# Patient Record
Sex: FEMALE | Race: WHITE | NOT HISPANIC OR LATINO
[De-identification: names, ages, dates, MRNs, and addresses within clinical notes are randomized per-mention and may not be internally consistent; named-entity substitution may affect disease eponyms.]

---

## 2017-06-07 ENCOUNTER — APPOINTMENT (OUTPATIENT)
Dept: GASTROENTEROLOGY | Facility: CLINIC | Age: 83
End: 2017-06-07

## 2017-06-07 VITALS
HEART RATE: 95 BPM | TEMPERATURE: 97.9 F | SYSTOLIC BLOOD PRESSURE: 158 MMHG | OXYGEN SATURATION: 98 % | HEIGHT: 63 IN | WEIGHT: 204 LBS | BODY MASS INDEX: 36.14 KG/M2 | DIASTOLIC BLOOD PRESSURE: 86 MMHG

## 2017-06-07 DIAGNOSIS — Z82.49 FAMILY HISTORY OF ISCHEMIC HEART DISEASE AND OTHER DISEASES OF THE CIRCULATORY SYSTEM: ICD-10-CM

## 2017-06-07 DIAGNOSIS — Z86.010 PERSONAL HISTORY OF COLONIC POLYPS: ICD-10-CM

## 2017-06-07 DIAGNOSIS — R19.7 DIARRHEA, UNSPECIFIED: ICD-10-CM

## 2017-06-07 DIAGNOSIS — Z83.1 FAMILY HISTORY OF OTHER INFECTIOUS AND PARASITIC DISEASES: ICD-10-CM

## 2018-07-09 ENCOUNTER — APPOINTMENT (OUTPATIENT)
Dept: GASTROENTEROLOGY | Facility: CLINIC | Age: 84
End: 2018-07-09
Payer: MEDICARE

## 2018-07-09 VITALS
HEART RATE: 88 BPM | HEIGHT: 63 IN | WEIGHT: 205 LBS | RESPIRATION RATE: 16 BRPM | BODY MASS INDEX: 36.32 KG/M2 | DIASTOLIC BLOOD PRESSURE: 75 MMHG | SYSTOLIC BLOOD PRESSURE: 130 MMHG | TEMPERATURE: 98.3 F | OXYGEN SATURATION: 98 %

## 2018-07-09 DIAGNOSIS — K43.2 INCISIONAL HERNIA W/OUT OBSTRUCTION OR GANGRENE: ICD-10-CM

## 2018-07-09 DIAGNOSIS — R15.9 FULL INCONTINENCE OF FECES: ICD-10-CM

## 2018-07-09 DIAGNOSIS — K43.9 VENTRAL HERNIA W/OUT OBSTRUCTION OR GANGRENE: ICD-10-CM

## 2018-07-09 PROCEDURE — 99214 OFFICE O/P EST MOD 30 MIN: CPT

## 2019-07-29 ENCOUNTER — APPOINTMENT (OUTPATIENT)
Dept: GASTROENTEROLOGY | Facility: CLINIC | Age: 85
End: 2019-07-29
Payer: MEDICARE

## 2019-07-29 VITALS
SYSTOLIC BLOOD PRESSURE: 140 MMHG | WEIGHT: 205 LBS | HEART RATE: 108 BPM | DIASTOLIC BLOOD PRESSURE: 90 MMHG | HEIGHT: 63 IN | TEMPERATURE: 98.7 F | BODY MASS INDEX: 36.32 KG/M2 | OXYGEN SATURATION: 99 %

## 2019-07-29 DIAGNOSIS — K58.0 IRRITABLE BOWEL SYNDROME WITH DIARRHEA: ICD-10-CM

## 2019-07-29 DIAGNOSIS — K62.7 RADIATION PROCTITIS: ICD-10-CM

## 2019-07-29 DIAGNOSIS — Z85.42 PERSONAL HISTORY OF MALIGNANT NEOPLASM OF OTHER PARTS OF UTERUS: ICD-10-CM

## 2019-07-29 DIAGNOSIS — Z86.39 PERSONAL HISTORY OF OTHER ENDOCRINE, NUTRITIONAL AND METABOLIC DISEASE: ICD-10-CM

## 2019-07-29 PROCEDURE — 99214 OFFICE O/P EST MOD 30 MIN: CPT

## 2019-07-29 RX ORDER — METHYLCELLULOSE 2 G/10.2G
POWDER, FOR SOLUTION ORAL
Qty: 1 | Refills: 0 | Status: ACTIVE | COMMUNITY
Start: 2018-07-09

## 2019-07-29 RX ORDER — DICYCLOMINE HYDROCHLORIDE 10 MG/1
10 CAPSULE ORAL 3 TIMES DAILY
Qty: 50 | Refills: 1 | Status: ACTIVE | COMMUNITY
Start: 2019-07-29 | End: 1900-01-01

## 2019-07-29 NOTE — REVIEW OF SYSTEMS
[Eyesight Problems] : eyesight problems [Loss Of Hearing] : hearing loss [As Noted in HPI] : as noted in HPI [Arthralgias] : arthralgias [Joint Pain] : joint pain [FreeTextEntry9] : Walks with walker [Negative] : Endocrine

## 2019-07-29 NOTE — HISTORY OF PRESENT ILLNESS
[FreeTextEntry1] : Patient is an 89-year-old female with rare episodes of fecal incontinence. Maybe once a year.  Her rectal urgency is also improved on a probiotic and Citrucel daily. She does not have any abdominal pain or bloating. Patient takes dicyclomine on a p.r.n. basis with relief.\par Patient does have a history of uterine cancer which was treated with hysterectomy and radiation therapy in the past. She does have a history of having residual radiation proctitis.

## 2019-07-29 NOTE — PHYSICAL EXAM
[Sclera] : the sclera and conjunctiva were normal [General Appearance - Alert] : alert [General Appearance - In No Acute Distress] : in no acute distress [PERRL With Normal Accommodation] : pupils were equal in size, round, and reactive to light [Extraocular Movements] : extraocular movements were intact [Outer Ear] : the ears and nose were normal in appearance [Oropharynx] : the oropharynx was normal [Neck Appearance] : the appearance of the neck was normal [Neck Cervical Mass (___cm)] : no neck mass was observed [Thyroid Nodule] : there were no palpable thyroid nodules [Jugular Venous Distention Increased] : there was no jugular-venous distention [Thyroid Diffuse Enlargement] : the thyroid was not enlarged [Heart Rate And Rhythm] : heart rate was normal and rhythm regular [Auscultation Breath Sounds / Voice Sounds] : lungs were clear to auscultation bilaterally [Heart Sounds] : normal S1 and S2 [Heart Sounds Pericardial Friction Rub] : no pericardial rub [Heart Sounds Gallop] : no gallops [Murmurs] : no murmurs [Bowel Sounds] : normal bowel sounds [Abdomen Soft] : soft [] : no hepato-splenomegaly [Abdomen Tenderness] : non-tender [No CVA Tenderness] : no ~M costovertebral angle tenderness [Abdomen Mass (___ Cm)] : no abdominal mass palpated [Oriented To Time, Place, And Person] : oriented to person, place, and time [No Spinal Tenderness] : no spinal tenderness [FreeTextEntry1] : Walks with walker [Affect] : the affect was normal [Impaired Insight] : insight and judgment were intact

## 2021-01-26 ENCOUNTER — INPATIENT (INPATIENT)
Facility: HOSPITAL | Age: 87
LOS: 3 days | Discharge: HOME | DRG: 690 | End: 2021-01-30
Attending: INTERNAL MEDICINE | Admitting: INTERNAL MEDICINE
Payer: MEDICARE

## 2021-01-26 VITALS
OXYGEN SATURATION: 99 % | HEART RATE: 164 BPM | SYSTOLIC BLOOD PRESSURE: 109 MMHG | TEMPERATURE: 98 F | DIASTOLIC BLOOD PRESSURE: 73 MMHG | RESPIRATION RATE: 22 BRPM | WEIGHT: 201.94 LBS | HEIGHT: 67 IN

## 2021-01-26 DIAGNOSIS — I10 ESSENTIAL (PRIMARY) HYPERTENSION: ICD-10-CM

## 2021-01-26 DIAGNOSIS — I48.91 UNSPECIFIED ATRIAL FIBRILLATION: ICD-10-CM

## 2021-01-26 DIAGNOSIS — N12 TUBULO-INTERSTITIAL NEPHRITIS, NOT SPECIFIED AS ACUTE OR CHRONIC: ICD-10-CM

## 2021-01-26 DIAGNOSIS — I47.1 SUPRAVENTRICULAR TACHYCARDIA: ICD-10-CM

## 2021-01-26 DIAGNOSIS — E11.9 TYPE 2 DIABETES MELLITUS WITHOUT COMPLICATIONS: ICD-10-CM

## 2021-01-26 LAB
ALBUMIN SERPL ELPH-MCNC: 4.1 G/DL — SIGNIFICANT CHANGE UP (ref 3.3–5)
ALP SERPL-CCNC: 208 U/L — HIGH (ref 40–120)
ALT FLD-CCNC: 33 U/L — SIGNIFICANT CHANGE UP (ref 10–45)
ANION GAP SERPL CALC-SCNC: 14 MMOL/L — SIGNIFICANT CHANGE UP (ref 5–17)
APPEARANCE UR: ABNORMAL
APTT BLD: 28.9 SEC — SIGNIFICANT CHANGE UP (ref 27.5–35.5)
AST SERPL-CCNC: 27 U/L — SIGNIFICANT CHANGE UP (ref 10–40)
BACTERIA # UR AUTO: ABNORMAL
BASOPHILS # BLD AUTO: 0.03 K/UL — SIGNIFICANT CHANGE UP (ref 0–0.2)
BASOPHILS NFR BLD AUTO: 0.3 % — SIGNIFICANT CHANGE UP (ref 0–2)
BILIRUB SERPL-MCNC: 0.8 MG/DL — SIGNIFICANT CHANGE UP (ref 0.2–1.2)
BILIRUB UR-MCNC: NEGATIVE — SIGNIFICANT CHANGE UP
BLD GP AB SCN SERPL QL: NEGATIVE — SIGNIFICANT CHANGE UP
BUN SERPL-MCNC: 20 MG/DL — SIGNIFICANT CHANGE UP (ref 7–23)
CALCIUM SERPL-MCNC: 9.9 MG/DL — SIGNIFICANT CHANGE UP (ref 8.4–10.5)
CHLORIDE SERPL-SCNC: 99 MMOL/L — SIGNIFICANT CHANGE UP (ref 96–108)
CO2 SERPL-SCNC: 22 MMOL/L — SIGNIFICANT CHANGE UP (ref 22–31)
COLOR SPEC: SIGNIFICANT CHANGE UP
CREAT SERPL-MCNC: 0.96 MG/DL — SIGNIFICANT CHANGE UP (ref 0.5–1.3)
DIFF PNL FLD: ABNORMAL
EOSINOPHIL # BLD AUTO: 0.12 K/UL — SIGNIFICANT CHANGE UP (ref 0–0.5)
EOSINOPHIL NFR BLD AUTO: 1.2 % — SIGNIFICANT CHANGE UP (ref 0–6)
EPI CELLS # UR: 2 — SIGNIFICANT CHANGE UP
GAS PNL BLDV: SIGNIFICANT CHANGE UP
GAS PNL BLDV: SIGNIFICANT CHANGE UP
GLUCOSE BLDC GLUCOMTR-MCNC: 141 MG/DL — HIGH (ref 70–99)
GLUCOSE SERPL-MCNC: 187 MG/DL — HIGH (ref 70–99)
GLUCOSE UR QL: ABNORMAL
HCT VFR BLD CALC: 39 % — SIGNIFICANT CHANGE UP (ref 34.5–45)
HGB BLD-MCNC: 12.6 G/DL — SIGNIFICANT CHANGE UP (ref 11.5–15.5)
HYALINE CASTS # UR AUTO: 0 /LPF — SIGNIFICANT CHANGE UP (ref 0–7)
IMM GRANULOCYTES NFR BLD AUTO: 0.4 % — SIGNIFICANT CHANGE UP (ref 0–1.5)
INR BLD: 1.09 RATIO — SIGNIFICANT CHANGE UP (ref 0.88–1.16)
KETONES UR-MCNC: NEGATIVE — SIGNIFICANT CHANGE UP
LEUKOCYTE ESTERASE UR-ACNC: ABNORMAL
LIDOCAIN IGE QN: 34 U/L — SIGNIFICANT CHANGE UP (ref 7–60)
LYMPHOCYTES # BLD AUTO: 1.54 K/UL — SIGNIFICANT CHANGE UP (ref 1–3.3)
LYMPHOCYTES # BLD AUTO: 14.9 % — SIGNIFICANT CHANGE UP (ref 13–44)
MCHC RBC-ENTMCNC: 30 PG — SIGNIFICANT CHANGE UP (ref 27–34)
MCHC RBC-ENTMCNC: 32.3 GM/DL — SIGNIFICANT CHANGE UP (ref 32–36)
MCV RBC AUTO: 92.9 FL — SIGNIFICANT CHANGE UP (ref 80–100)
MONOCYTES # BLD AUTO: 0.96 K/UL — HIGH (ref 0–0.9)
MONOCYTES NFR BLD AUTO: 9.3 % — SIGNIFICANT CHANGE UP (ref 2–14)
NEUTROPHILS # BLD AUTO: 7.62 K/UL — HIGH (ref 1.8–7.4)
NEUTROPHILS NFR BLD AUTO: 73.9 % — SIGNIFICANT CHANGE UP (ref 43–77)
NITRITE UR-MCNC: POSITIVE
NRBC # BLD: 0 /100 WBCS — SIGNIFICANT CHANGE UP (ref 0–0)
PH UR: 6 — SIGNIFICANT CHANGE UP (ref 5–8)
PLATELET # BLD AUTO: 417 K/UL — HIGH (ref 150–400)
POTASSIUM SERPL-MCNC: 4.2 MMOL/L — SIGNIFICANT CHANGE UP (ref 3.5–5.3)
POTASSIUM SERPL-SCNC: 4.2 MMOL/L — SIGNIFICANT CHANGE UP (ref 3.5–5.3)
PROT SERPL-MCNC: 7.7 G/DL — SIGNIFICANT CHANGE UP (ref 6–8.3)
PROT UR-MCNC: 100 — SIGNIFICANT CHANGE UP
PROTHROM AB SERPL-ACNC: 13 SEC — SIGNIFICANT CHANGE UP (ref 10.6–13.6)
RBC # BLD: 4.2 M/UL — SIGNIFICANT CHANGE UP (ref 3.8–5.2)
RBC # FLD: 12.8 % — SIGNIFICANT CHANGE UP (ref 10.3–14.5)
RBC CASTS # UR COMP ASSIST: 20 /HPF — HIGH (ref 0–4)
RH IG SCN BLD-IMP: POSITIVE — SIGNIFICANT CHANGE UP
RH IG SCN BLD-IMP: POSITIVE — SIGNIFICANT CHANGE UP
SARS-COV-2 RNA SPEC QL NAA+PROBE: SIGNIFICANT CHANGE UP
SODIUM SERPL-SCNC: 135 MMOL/L — SIGNIFICANT CHANGE UP (ref 135–145)
SP GR SPEC: 1.02 — SIGNIFICANT CHANGE UP (ref 1.01–1.02)
TROPONIN T, HIGH SENSITIVITY RESULT: 15 NG/L — SIGNIFICANT CHANGE UP (ref 0–51)
UROBILINOGEN FLD QL: NEGATIVE — SIGNIFICANT CHANGE UP
WBC # BLD: 10.31 K/UL — SIGNIFICANT CHANGE UP (ref 3.8–10.5)
WBC # FLD AUTO: 10.31 K/UL — SIGNIFICANT CHANGE UP (ref 3.8–10.5)
WBC UR QL: >50

## 2021-01-26 PROCEDURE — 74177 CT ABD & PELVIS W/CONTRAST: CPT | Mod: 26,MA

## 2021-01-26 PROCEDURE — 93010 ELECTROCARDIOGRAM REPORT: CPT

## 2021-01-26 PROCEDURE — 99285 EMERGENCY DEPT VISIT HI MDM: CPT

## 2021-01-26 RX ORDER — INSULIN LISPRO 100/ML
VIAL (ML) SUBCUTANEOUS AT BEDTIME
Refills: 0 | Status: DISCONTINUED | OUTPATIENT
Start: 2021-01-26 | End: 2021-01-30

## 2021-01-26 RX ORDER — SODIUM CHLORIDE 9 MG/ML
1000 INJECTION INTRAMUSCULAR; INTRAVENOUS; SUBCUTANEOUS ONCE
Refills: 0 | Status: COMPLETED | OUTPATIENT
Start: 2021-01-26 | End: 2021-01-26

## 2021-01-26 RX ORDER — ACETAMINOPHEN 500 MG
650 TABLET ORAL ONCE
Refills: 0 | Status: COMPLETED | OUTPATIENT
Start: 2021-01-26 | End: 2021-01-26

## 2021-01-26 RX ORDER — INSULIN LISPRO 100/ML
VIAL (ML) SUBCUTANEOUS
Refills: 0 | Status: DISCONTINUED | OUTPATIENT
Start: 2021-01-26 | End: 2021-01-30

## 2021-01-26 RX ORDER — HEPARIN SODIUM 5000 [USP'U]/ML
5000 INJECTION INTRAVENOUS; SUBCUTANEOUS EVERY 8 HOURS
Refills: 0 | Status: DISCONTINUED | OUTPATIENT
Start: 2021-01-26 | End: 2021-01-26

## 2021-01-26 RX ORDER — CEFTRIAXONE 500 MG/1
1000 INJECTION, POWDER, FOR SOLUTION INTRAMUSCULAR; INTRAVENOUS EVERY 24 HOURS
Refills: 0 | Status: COMPLETED | OUTPATIENT
Start: 2021-01-26 | End: 2021-01-28

## 2021-01-26 RX ORDER — DEXTROSE 50 % IN WATER 50 %
12.5 SYRINGE (ML) INTRAVENOUS ONCE
Refills: 0 | Status: DISCONTINUED | OUTPATIENT
Start: 2021-01-26 | End: 2021-01-30

## 2021-01-26 RX ORDER — DEXTROSE 50 % IN WATER 50 %
25 SYRINGE (ML) INTRAVENOUS ONCE
Refills: 0 | Status: DISCONTINUED | OUTPATIENT
Start: 2021-01-26 | End: 2021-01-30

## 2021-01-26 RX ORDER — SODIUM CHLORIDE 9 MG/ML
1000 INJECTION, SOLUTION INTRAVENOUS
Refills: 0 | Status: DISCONTINUED | OUTPATIENT
Start: 2021-01-26 | End: 2021-01-30

## 2021-01-26 RX ORDER — METOPROLOL TARTRATE 50 MG
12.5 TABLET ORAL
Refills: 0 | Status: DISCONTINUED | OUTPATIENT
Start: 2021-01-26 | End: 2021-01-30

## 2021-01-26 RX ORDER — GLUCAGON INJECTION, SOLUTION 0.5 MG/.1ML
1 INJECTION, SOLUTION SUBCUTANEOUS ONCE
Refills: 0 | Status: DISCONTINUED | OUTPATIENT
Start: 2021-01-26 | End: 2021-01-30

## 2021-01-26 RX ORDER — APIXABAN 2.5 MG/1
5 TABLET, FILM COATED ORAL
Refills: 0 | Status: DISCONTINUED | OUTPATIENT
Start: 2021-01-26 | End: 2021-01-29

## 2021-01-26 RX ORDER — DEXTROSE 50 % IN WATER 50 %
15 SYRINGE (ML) INTRAVENOUS ONCE
Refills: 0 | Status: DISCONTINUED | OUTPATIENT
Start: 2021-01-26 | End: 2021-01-30

## 2021-01-26 RX ADMIN — SODIUM CHLORIDE 1000 MILLILITER(S): 9 INJECTION INTRAMUSCULAR; INTRAVENOUS; SUBCUTANEOUS at 15:10

## 2021-01-26 RX ADMIN — HEPARIN SODIUM 5000 UNIT(S): 5000 INJECTION INTRAVENOUS; SUBCUTANEOUS at 20:52

## 2021-01-26 RX ADMIN — CEFTRIAXONE 100 MILLIGRAM(S): 500 INJECTION, POWDER, FOR SOLUTION INTRAMUSCULAR; INTRAVENOUS at 16:54

## 2021-01-26 RX ADMIN — Medication 650 MILLIGRAM(S): at 21:12

## 2021-01-26 RX ADMIN — SODIUM CHLORIDE 1000 MILLILITER(S): 9 INJECTION INTRAMUSCULAR; INTRAVENOUS; SUBCUTANEOUS at 14:05

## 2021-01-26 NOTE — H&P ADULT - RS GEN PE MLT RESP DETAILS PC
airway patent/breath sounds equal/no chest wall tenderness/no intercostal retractions/no rales/no rhonchi

## 2021-01-26 NOTE — H&P ADULT - ASSESSMENT
89 yo female with pmhx of HTN, HLD, NIDDM who presents to ED with complaint of R flank/R lumbar triangle pain

## 2021-01-26 NOTE — CONSULT NOTE ADULT - SUBJECTIVE AND OBJECTIVE BOX
Now back in SR  Start AC with Eliquis  Metoprolol for rate control CHIEF COMPLAINT:Patient is a 89y old  Female who presents with a chief complaint of Rt flank pain x 5 days (27 Jan 2021 13:42)      HISTORY OF PRESENT ILLNESS:HPI:  89 yo female with pmhx of htn, hld, IDDM p/w Rt flank Pain x 5 days. Patient states that her pain about 8/10 in Rt flank radiating across abdomen with dysuria. No hx nausea/ vomiting/ abdominal pain/ fevers. No hx chest pain/ palpitations/ shortness of breath.     In the ed patient noted to have SVT converted to sinus s/p iv hydration.   Patient had CT abdomen shows Rt moderate hydronephrosis.  (26 Jan 2021 18:34)      PAST MEDICAL & SURGICAL HISTORY:  HLD (hyperlipidemia)    Benign essential HTN    DM (diabetes mellitus)            MEDICATIONS:  apixaban 5 milliGRAM(s) Oral two times a day  metoprolol tartrate 12.5 milliGRAM(s) Oral two times a day    cefTRIAXone   IVPB 1000 milliGRAM(s) IV Intermittent every 24 hours          dextrose 40% Gel 15 Gram(s) Oral once  dextrose 50% Injectable 25 Gram(s) IV Push once  dextrose 50% Injectable 12.5 Gram(s) IV Push once  dextrose 50% Injectable 25 Gram(s) IV Push once  glucagon  Injectable 1 milliGRAM(s) IntraMuscular once  insulin lispro (ADMELOG) corrective regimen sliding scale   SubCutaneous three times a day before meals  insulin lispro (ADMELOG) corrective regimen sliding scale   SubCutaneous at bedtime    dextrose 5%. 1000 milliLiter(s) IV Continuous <Continuous>  dextrose 5%. 1000 milliLiter(s) IV Continuous <Continuous>      FAMILY HISTORY:      Non-contributory    SOCIAL HISTORY:    not a smoker    Allergies    No Known Allergies    Intolerances    	    REVIEW OF SYSTEMS:  CONSTITUTIONAL: No fever  EYES: No eye pain, visual disturbances, or discharge  ENMT:  No difficulty hearing, tinnitus  NECK: No pain or stiffness  RESPIRATORY: No cough, wheezing,  CARDIOVASCULAR: No chest pain, palpitations, passing out, dizziness, or leg swelling  GASTROINTESTINAL:  No nausea, vomiting, diarrhea or constipation. No melena.  GENITOURINARY: No dysuria, hematuria  NEUROLOGICAL: No stroke like symptoms  SKIN: No burning or lesions   ENDOCRINE: No heat or cold intolerance  MUSCULOSKELETAL: No joint pain or swelling  PSYCHIATRIC: No  anxiety, mood swings  HEME/LYMPH: No bleeding gums  ALLERGY AND IMMUNOLOGIC: No hives or eczema	    All other ROS negative    PHYSICAL EXAM:  T(C): 36.7 (01-27-21 @ 08:12), Max: 37 (01-27-21 @ 04:06)  HR: 79 (01-27-21 @ 08:12) (79 - 95)  BP: 128/68 (01-27-21 @ 08:12) (110/60 - 156/76)  RR: 18 (01-27-21 @ 08:12) (16 - 18)  SpO2: 97% (01-27-21 @ 08:12) (97% - 100%)  Wt(kg): --  I&O's Summary      Appearance: Normal	  HEENT:   Normal oral mucosa, EOMI	  Cardiovascular:  S1 S2, No JVD,    Respiratory: Lungs clear to auscultation	  Psychiatry: Alert  Gastrointestinal:  Soft, Non-tender, + BS	  Skin: No rashes   Neurologic: Non-focal  Extremities:  No edema  Vascular: Peripheral pulses palpable    	    	  	  CARDIAC MARKERS:  Labs personally reviewed by me                                  12.6   10.31 )-----------( 417      ( 26 Jan 2021 14:01 )             39.0     01-26    135  |  99  |  20  ----------------------------<  187<H>  4.2   |  22  |  0.96    Ca    9.9      26 Jan 2021 14:01    TPro  7.7  /  Alb  4.1  /  TBili  0.8  /  DBili  x   /  AST  27  /  ALT  33  /  AlkPhos  208<H>  01-26                 Assessment /Plan:   Assessment:  · Assessment	   89 yo female with pmhx of HTN, HLD, NIDDM who presents to ED with complaint of R flank/R lumbar triangle pain       Problem/Plan - 1:  ·  Problem: SVT (supraventricular tachycardia).  Plan: Converted to sinus s/p iv hydration. will Monitor on tele.     Start patient on Beta blockers low dose and Eliquis until clarified if AF or not  initial EKG not found but ER documents AF  - EP consult in AM     Problem/Plan - 2:  ·  Problem: Benign essential HTN.  Plan: Hold Enalapril and Amlodipine for now.   Monitor blood pressure off meds.      Problem/Plan - 3:  ·  Problem: Type 2 diabetes mellitus without complication, without long-term current use of insulin.  Plan: HISS follow up A1C.      Problem/Plan - 4:  ·  Problem: Pyelonephritis.  Plan: Acute pyelonephritis- Continue with Ceftriaxone, follow up urine and blood cultures.   CT abdomen shows moderate hydronephrosis- urology consult.             Differential diagnosis and plan of care discussed with patient after the evaluation. Counseling on diet, nutritional counseling, weight management, exercise and medication compliance was done. Advanced care planning/advanced directives discussed with patient/family. DNR status including forceful chest compressions to attempt to restart the heart, ventilator support/artificial breathing, electric shock, artificial nutrition, health care proxy, Molst form all discussed with pt. Pt wishes to consider. More than fifteen minutes spent on discussing advanced directives.          Mustapha Mcpherson DO [facility]  Cardiovascular Medicine  [address], [address]  Office [phone]  Cell [phone]

## 2021-01-26 NOTE — ED ADULT NURSE NOTE - OBJECTIVE STATEMENT
89y female coming in c/o rapid heart rate. A&Ox3 and tachycardic. Hx of HTN and uterine cancer. Pt reports right sided abdominal pain radiating to the back and cloudy urine x5 days with associated decreased PO intake and nausea. Pt reports going to PMD where noted to be tachycardic. Pt reports she did recently have a high HR but her PMD did not need any intervention at that time. Denies chest pain, sob, fever/chills, diarrhea/vomit. Upon exam, pt A&Ox3. Tachycardic, EKG done in triage, cardiac monitor applied, aflutter noted. Respirations even and unlabored. Abdomen soft, tender in RLQ.

## 2021-01-26 NOTE — ED ADULT NURSE REASSESSMENT NOTE - NS ED NURSE REASSESS COMMENT FT1
Bloodwork, including blood cultures x2, drawn and sent to lab. As per Akilah CHAIDEZ, pt straight cathed by 2 ED RN using sterile technique. 200mL cloudy yellow urine drained. 1L NSS bolus initiated. Pt pending CT. Pt aware of plan of care. Will continue to monitor.

## 2021-01-26 NOTE — ED ADULT NURSE REASSESSMENT NOTE - NS ED NURSE REASSESS COMMENT FT1
IVF complete. Repeat vbg sent to lab. Vital signs performed. Pt to CT now. COVID swab sent to lab. Pt reports feeling better.

## 2021-01-26 NOTE — H&P ADULT - NSICDXPASTMEDICALHX_GEN_ALL_CORE_FT
PAST MEDICAL HISTORY:  Benign essential HTN     DM (diabetes mellitus)     HLD (hyperlipidemia)

## 2021-01-26 NOTE — ED PROVIDER NOTE - PHYSICAL EXAMINATION
General: well appearing, interactive, well nourished, no apparent distress, ncat  HEENT: EOMI, PERRLA, normal mucosa, normal oropharynx, no lesions on the lips or on oral mucosa, normal external ear  Neck: supple, no lymphadenopathy, full range of motion, no nuchal rigidity  CV: tachycardic, normal S1 and S2 with no murmur, capillary refill less than two seconds  Resp: lungs CTA b/l, good aeration bilaterally, symmetric chest wall   Abd: non-distended, soft, non-tender  : r flank ttp  MSK: full range of motion, no cyanosis, no edema, no clubbing, no immobility  Neuro: CN II-XII grossly intact, muscle strength 5/5 in all extremities

## 2021-01-26 NOTE — H&P ADULT - HISTORY OF PRESENT ILLNESS
89 yo female with pmhx of htn, hld, IDDM p/w Rt flank Pain x 5 days. Patient states that her pain about 8/10 in Rt flank radiating across abdomen with dysuria. No hx nausea/ vomiting/ abdominal pain/ fevers. No hx chest pain/ palpitations/ shortness of breath.     In the ed patient noted to have SVT converted to sinus s/p iv hydration.   Patient had CT abdomen shows Rt moderate hydronephrosis.

## 2021-01-26 NOTE — ED PROVIDER NOTE - CARE PLAN
Principal Discharge DX:	Afib   Principal Discharge DX:	Afib  Goal:	R pyelonephritis  Secondary Diagnosis:	Pyelonephritis

## 2021-01-26 NOTE — ED PROVIDER NOTE - NS ED ROS FT
GENERAL: No fever or chills, //             EYES: no change in vision, //             HEENT: no trouble swallowing or speaking, //             CARDIAC: palpitations , //              PULMONARY: no cough or SOB, //             GI: flank pain  //             : cloudy urine  //            SKIN: no rashes,  //            NEURO: no headache,  //             MSK: No joint pain otherwise as HPI or negative. ~Pietro Isbell DO PGY3

## 2021-01-26 NOTE — H&P ADULT - PROBLEM SELECTOR PLAN 1
Converted to sinus s/p iv hydration. Monitor on tele.   Cards consult appreciated. Start patient on Beta blockers low dose and Eliquis as per cards.

## 2021-01-26 NOTE — ED PROVIDER NOTE - CLINICAL SUMMARY MEDICAL DECISION MAKING FREE TEXT BOX
cindy pgy3: 89 yo f pw several days of r flank pain associated w/ cloudy urine, concern for pyelo/renal abscess/infected stone/colitis. c/b rapid hr, appears to be a flutter in 150s. possible secondary reaction to infection? will attempt to give ivf as decreased po reported prior to attempting rate control.

## 2021-01-26 NOTE — ED ADULT NURSE REASSESSMENT NOTE - NS ED NURSE REASSESS COMMENT FT1
Ceftriaxone administered for UTI. Pt admitted to tele for afib/pyelonephritis. RTM. No acute distress noted. Will continue to monitor.

## 2021-01-26 NOTE — ED PROVIDER NOTE - OBJECTIVE STATEMENT
89 yo f pmh htn, hld, niddm, pw r flank pain. 5 days of flank pain, r sided, radiate to groin, 4/10, intermittent, no prior hx. associated w/ cloudy urine. reports racing hr, found to be tachy 150s at pcp, sent to ed. denies cp/sob. no hx a fib. not on ac.

## 2021-01-26 NOTE — H&P ADULT - PROBLEM SELECTOR PLAN 2
Acute pyelonephritis- Continue with Ceftriaxone, follow up urine and blood cultures.   CT abdomen shows moderate hydronephrosis- urology consult.

## 2021-01-26 NOTE — ED PROVIDER NOTE - ATTENDING CONTRIBUTION TO CARE
Attending MD Shahid.  Agree with above.  Pt is a 89 yo female with pmhx of HTN, HLD, NIDDM who presents to ED with complaint of R flank/R lumbar triangle pain radiating to groin ~4/10 on arrival with pain to 8/10 at times at home since onset ~5 days ago.  PT endorses no previous similar events.  Went to PCP (Dr. Deluna) today to be evaluated for this and found to be tachy to 160's.  No known hx of afib/AC.  Pt alert, oriented in NAD in ED.  No acute resp distress.  On arrival concern for UTI +/- pyelonephritis.  CTAP ordered for further assessment of abdominal and flank pain.  Hydration provided to attempt to reduce rate and pt has broken to NSR without overt rate control or cardioversion.      Pt's CTAP and UA c/w pyelo as suspected on arrival.  Pt hemodynamically stable for admission to medicine for management of pyelonephritis with paroxysmal afib +/- new onset afib.

## 2021-01-26 NOTE — CHART NOTE - NSCHARTNOTEFT_GEN_A_CORE
89 yo female with PMHx of HTN, HLD, DM2 on metformin admitted with pyelonephritis on rocephin. Found to be in A flutter RVR on admission.  Patient was seen by Cardiologist Dr. Mcpherson who recommended starting Eliquis and metoprolol for rate control.  Discussed with Dr. Deluna and agrees with above. Orders facilitated as requested by Attending- Eliquis 5mg bid and metoprolol 12.5mg bid.  Discussed with pt and in agreement with the plan.    Veronica Dia, C[facility]  Medicine  [id number]

## 2021-01-27 LAB
A1C WITH ESTIMATED AVERAGE GLUCOSE RESULT: 7.2 % — HIGH (ref 4–5.6)
ESTIMATED AVERAGE GLUCOSE: 160 MG/DL — HIGH (ref 68–114)
GLUCOSE BLDC GLUCOMTR-MCNC: 152 MG/DL — HIGH (ref 70–99)
GLUCOSE BLDC GLUCOMTR-MCNC: 163 MG/DL — HIGH (ref 70–99)
GLUCOSE BLDC GLUCOMTR-MCNC: 190 MG/DL — HIGH (ref 70–99)
GLUCOSE BLDC GLUCOMTR-MCNC: 210 MG/DL — HIGH (ref 70–99)
SARS-COV-2 IGG SERPL QL IA: NEGATIVE — SIGNIFICANT CHANGE UP
SARS-COV-2 IGM SERPL IA-ACNC: 0.07 INDEX — SIGNIFICANT CHANGE UP

## 2021-01-27 RX ORDER — LANOLIN ALCOHOL/MO/W.PET/CERES
3 CREAM (GRAM) TOPICAL ONCE
Refills: 0 | Status: COMPLETED | OUTPATIENT
Start: 2021-01-27 | End: 2021-01-27

## 2021-01-27 RX ADMIN — Medication 12.5 MILLIGRAM(S): at 17:43

## 2021-01-27 RX ADMIN — Medication 3 MILLIGRAM(S): at 23:54

## 2021-01-27 RX ADMIN — APIXABAN 5 MILLIGRAM(S): 2.5 TABLET, FILM COATED ORAL at 05:54

## 2021-01-27 RX ADMIN — Medication 1: at 19:11

## 2021-01-27 RX ADMIN — APIXABAN 5 MILLIGRAM(S): 2.5 TABLET, FILM COATED ORAL at 17:43

## 2021-01-27 RX ADMIN — Medication 1: at 09:36

## 2021-01-27 RX ADMIN — Medication 12.5 MILLIGRAM(S): at 05:54

## 2021-01-27 RX ADMIN — Medication 2: at 13:42

## 2021-01-27 RX ADMIN — CEFTRIAXONE 100 MILLIGRAM(S): 500 INJECTION, POWDER, FOR SOLUTION INTRAMUSCULAR; INTRAVENOUS at 17:43

## 2021-01-27 NOTE — PROGRESS NOTE ADULT - PROBLEM SELECTOR PLAN 2
Acute pyelonephritis- Continue with Ceftriaxone, follow up urine and blood cultures.   CT abdomen shows moderate hydronephrosis.

## 2021-01-27 NOTE — CONSULT NOTE ADULT - SUBJECTIVE AND OBJECTIVE BOX
EP ATTENDING      HISTORY OF PRESENT ILLNESS: She is a 89 y/o female PMH HTN, DM, and hyperlipidemia a/w flank pain. A CT abdomen-pelvis shows a right hydro. EP is now called because while in the ER she developed a rapid narrow complex tachycardia that is very suspicious for typical AVNRT. There are no discernable p-waves, but the rhythm was too regular to suggest Afib. She denies palpitations, syncope, nor angina. Echo pending. She is currently in NSR.      PAST MEDICAL & SURGICAL HISTORY:  HLD (hyperlipidemia)  HTN  DM (diabetes mellitus)  SVT      MEDICATIONS  (STANDING):  apixaban 5 milliGRAM(s) Oral two times a day  cefTRIAXone   IVPB 1000 milliGRAM(s) IV Intermittent every 24 hours  dextrose 40% Gel 15 Gram(s) Oral once  dextrose 5%. 1000 milliLiter(s) (50 mL/Hr) IV Continuous <Continuous>  dextrose 5%. 1000 milliLiter(s) (100 mL/Hr) IV Continuous <Continuous>  dextrose 50% Injectable 25 Gram(s) IV Push once  dextrose 50% Injectable 12.5 Gram(s) IV Push once  dextrose 50% Injectable 25 Gram(s) IV Push once  glucagon  Injectable 1 milliGRAM(s) IntraMuscular once  insulin lispro (ADMELOG) corrective regimen sliding scale   SubCutaneous three times a day before meals  insulin lispro (ADMELOG) corrective regimen sliding scale   SubCutaneous at bedtime  metoprolol tartrate 12.5 milliGRAM(s) Oral two times a day      Allergies    No Known Allergies    Intolerances        FAMILY HISTORY:    Non-contributary for premature coronary disease or sudden cardiac death    SOCIAL HISTORY:    [ x] Non-smoker  [ ] Smoker  [ ] Alcohol      REVIEW OF SYSTEMS:  [ ]chest pain  [  ]shortness of breath  [  ]palpitations  [  ]syncope  [ ]near syncope [ ]upper extremity weakness   [ ] lower extremity weakness  [  ]diplopia  [  ]altered mental status   [  ]fevers  [ ]chills [ ]nausea  [ ]vomitting  [  ]dysphagia    [ x]abdominal pain  [ ]melena  [ ]BRBPR    [  ]epistaxis  [  ]rash    [ ]lower extremity edema        [x ] All others negative	  [ ] Unable to obtain    PHYSICAL EXAM:  T(C): 36.7 (01-27-21 @ 08:12), Max: 37.4 (01-26-21 @ 14:56)  HR: 79 (01-27-21 @ 08:12) (79 - 158)  BP: 128/68 (01-27-21 @ 08:12) (110/60 - 156/76)  RR: 18 (01-27-21 @ 08:12) (16 - 20)  SpO2: 97% (01-27-21 @ 08:12) (97% - 100%)  Wt(kg): --    Appearance: Normal	  HEENT:   Normal oral mucosa, PERRL, EOMI	  Lymphatic: No lymphadenopathy , no edema  Cardiovascular: Normal S1 S2, No JVD, No murmurs , Peripheral pulses palpable 2+ bilaterally  Respiratory: Lungs clear to auscultation, normal effort 	  Gastrointestinal:  Soft, Non-tender, + BS	  Skin: No rashes, No ecchymoses, No cyanosis, warm to touch  Musculoskeletal: Normal range of motion, normal strength  Psychiatry:  Mood & affect appropriate      TELEMETRY: 	    ECG:  	    Echo:  NST:  Cath:  	  	  LABS:	 	                          12.6   10.31 )-----------( 417      ( 26 Jan 2021 14:01 )             39.0     01-26    135  |  99  |  20  ----------------------------<  187<H>  4.2   |  22  |  0.96    Ca    9.9      26 Jan 2021 14:01    TPro  7.7  /  Alb  4.1  /  TBili  0.8  /  DBili  x   /  AST  27  /  ALT  33  /  AlkPhos  208<H>  01-26    proBNP:   Lipid Profile:   HgA1c:   TSH:       A/P) She is a 89 y/o female PMH HTN, DM, and hyperlipidemia a/w flank pain. A CT abdomen-pelvis shows a right hydro. EP is now called because while in the ER she developed a rapid narrow complex tachycardia that is very suspicious for typical AVNRT. There are no discernable p-waves, but the rhythm was too regular to suggest Afib. She denies palpitations, syncope, nor angina. Echo pending. She is currently in NSR.    -admit to telemetry  -get echo r/o structural heart disease  -agree with metoprolol  -continue eliquis for now until a final diagnosis is established  -final EP reccs pending echo and tele  -if an exact diagnosis (AVNRT vs AF) is not made during this hospitalization recommend outpatient event monitoring  -will follow      Yvonne Garcia M.D., [facility]  Cardiac Electrophysiology  [facility]  [address] [place] [address], [address] [place] [address]  [email or web address]    office [phone]  pager [phone] EP ATTENDING      HISTORY OF PRESENT ILLNESS: She is a 89 y/o female PMH HTN, DM, and hyperlipidemia a/w flank pain. A CT abdomen-pelvis shows a right hydro. EP is now called because while in the ER she developed a rapid narrow complex tachycardia that is very suspicious for typical AVNRT. There are no discernable p-waves, but the rhythm was too regular to suggest Afib. There is no 12-lead EKG, and I only have a 1-lead rhythm strip to review. She denies palpitations, syncope, nor angina. Echo pending. She is currently in NSR.      PAST MEDICAL & SURGICAL HISTORY:  HLD (hyperlipidemia)  HTN  DM (diabetes mellitus)  SVT      MEDICATIONS  (STANDING):  apixaban 5 milliGRAM(s) Oral two times a day  cefTRIAXone   IVPB 1000 milliGRAM(s) IV Intermittent every 24 hours  dextrose 40% Gel 15 Gram(s) Oral once  dextrose 5%. 1000 milliLiter(s) (50 mL/Hr) IV Continuous <Continuous>  dextrose 5%. 1000 milliLiter(s) (100 mL/Hr) IV Continuous <Continuous>  dextrose 50% Injectable 25 Gram(s) IV Push once  dextrose 50% Injectable 12.5 Gram(s) IV Push once  dextrose 50% Injectable 25 Gram(s) IV Push once  glucagon  Injectable 1 milliGRAM(s) IntraMuscular once  insulin lispro (ADMELOG) corrective regimen sliding scale   SubCutaneous three times a day before meals  insulin lispro (ADMELOG) corrective regimen sliding scale   SubCutaneous at bedtime  metoprolol tartrate 12.5 milliGRAM(s) Oral two times a day      Allergies    No Known Allergies    Intolerances        FAMILY HISTORY:    Non-contributary for premature coronary disease or sudden cardiac death    SOCIAL HISTORY:    [ x] Non-smoker  [ ] Smoker  [ ] Alcohol      REVIEW OF SYSTEMS:  [ ]chest pain  [  ]shortness of breath  [  ]palpitations  [  ]syncope  [ ]near syncope [ ]upper extremity weakness   [ ] lower extremity weakness  [  ]diplopia  [  ]altered mental status   [  ]fevers  [ ]chills [ ]nausea  [ ]vomitting  [  ]dysphagia    [ x]abdominal pain  [ ]melena  [ ]BRBPR    [  ]epistaxis  [  ]rash    [ ]lower extremity edema        [x ] All others negative	  [ ] Unable to obtain    PHYSICAL EXAM:  T(C): 36.7 (01-27-21 @ 08:12), Max: 37.4 (01-26-21 @ 14:56)  HR: 79 (01-27-21 @ 08:12) (79 - 158)  BP: 128/68 (01-27-21 @ 08:12) (110/60 - 156/76)  RR: 18 (01-27-21 @ 08:12) (16 - 20)  SpO2: 97% (01-27-21 @ 08:12) (97% - 100%)  Wt(kg): --    Appearance: Normal	  HEENT:   Normal oral mucosa, PERRL, EOMI	  Lymphatic: No lymphadenopathy , no edema  Cardiovascular: Normal S1 S2, No JVD, No murmurs , Peripheral pulses palpable 2+ bilaterally  Respiratory: Lungs clear to auscultation, normal effort 	  Gastrointestinal:  Soft, Non-tender, + BS	  Skin: No rashes, No ecchymoses, No cyanosis, warm to touch  Musculoskeletal: Normal range of motion, normal strength  Psychiatry:  Mood & affect appropriate      TELEMETRY: 	    ECG:  	    Echo:  NST:  Cath:  	  	  LABS:	 	                          12.6   10.31 )-----------( 417      ( 26 Jan 2021 14:01 )             39.0     01-26    135  |  99  |  20  ----------------------------<  187<H>  4.2   |  22  |  0.96    Ca    9.9      26 Jan 2021 14:01    TPro  7.7  /  Alb  4.1  /  TBili  0.8  /  DBili  x   /  AST  27  /  ALT  33  /  AlkPhos  208<H>  01-26    proBNP:   Lipid Profile:   HgA1c:   TSH:       A/P) She is a 89 y/o female PMH HTN, DM, and hyperlipidemia a/w flank pain. A CT abdomen-pelvis shows a right hydro. EP is now called because while in the ER she developed a rapid narrow complex tachycardia that is very suspicious for typical AVNRT. There are no discernable p-waves, but the rhythm was too regular to suggest Afib. There is no 12-lead EKG, and I only have a 1-lead rhythm strip to review.  She denies palpitations, syncope, nor angina. Echo pending. She is currently in NSR.    -admit to telemetry  -get echo r/o structural heart disease  -agree with metoprolol  -continue eliquis for now until a final diagnosis is established  -final EP reccs pending echo and tele  -if an exact diagnosis (AVNRT vs AF) is not made during this hospitalization recommend outpatient event monitoring  -will follow      Yvonne Garcia M.D., [facility]  Cardiac Electrophysiology  [facility]  [address] [facility], [address] [place] [address]  [email or web address]    office [phone]  pager [phone]

## 2021-01-27 NOTE — PROGRESS NOTE ADULT - ASSESSMENT
89 yo female with pmhx of HTN, HLD, NIDDM who presents to ED with complaint of R flank/R lumbar triangle pain

## 2021-01-27 NOTE — PROGRESS NOTE ADULT - SUBJECTIVE AND OBJECTIVE BOX
Patient is a 89y old  Female who presents with a chief complaint of Rt flank pain x 5 days (27 Jan 2021 15:48)      SUBJECTIVE / OVERNIGHT EVENTS: no events over night     MEDICATIONS  (STANDING):  apixaban 5 milliGRAM(s) Oral two times a day  cefTRIAXone   IVPB 1000 milliGRAM(s) IV Intermittent every 24 hours  dextrose 40% Gel 15 Gram(s) Oral once  dextrose 5%. 1000 milliLiter(s) (50 mL/Hr) IV Continuous <Continuous>  dextrose 5%. 1000 milliLiter(s) (100 mL/Hr) IV Continuous <Continuous>  dextrose 50% Injectable 25 Gram(s) IV Push once  dextrose 50% Injectable 12.5 Gram(s) IV Push once  dextrose 50% Injectable 25 Gram(s) IV Push once  glucagon  Injectable 1 milliGRAM(s) IntraMuscular once  insulin lispro (ADMELOG) corrective regimen sliding scale   SubCutaneous three times a day before meals  insulin lispro (ADMELOG) corrective regimen sliding scale   SubCutaneous at bedtime  metoprolol tartrate 12.5 milliGRAM(s) Oral two times a day    MEDICATIONS  (PRN):      CAPILLARY BLOOD GLUCOSE      POCT Blood Glucose.: 152 mg/dL (27 Jan 2021 18:55)  POCT Blood Glucose.: 210 mg/dL (27 Jan 2021 12:49)  POCT Blood Glucose.: 163 mg/dL (27 Jan 2021 09:02)  POCT Blood Glucose.: 141 mg/dL (26 Jan 2021 20:54)    I&O's Summary    T(C): 36.6 (01-27-21 @ 17:44), Max: 36.6 (01-27-21 @ 17:44)  HR: 94 (01-27-21 @ 17:44) (94 - 94)  BP: 136/75 (01-27-21 @ 17:44) (136/75 - 136/75)  RR: 18 (01-27-21 @ 17:44) (18 - 18)  SpO2: 96% (01-27-21 @ 17:44) (96% - 96%)    PHYSICAL EXAM:  GENERAL: NAD, well-developed  HEAD:  Atraumatic, Normocephalic  EYES: EOMI, PERRLA, conjunctiva and sclera clear  NECK: Supple, No JVD  CHEST/LUNG: Clear to auscultation bilaterally; No wheeze  HEART: Regular rate and rhythm; No murmurs, rubs, or gallops  ABDOMEN: Soft, Nontender, Nondistended; Bowel sounds present  EXTREMITIES:  2+ Peripheral Pulses, No clubbing, cyanosis, or edema  PSYCH: AAOx3  NEUROLOGY: non-focal  SKIN: No rashes or lesions                          12.6   10.31 )-----------( 417      ( 26 Jan 2021 14:01 )             39.0           LIVER FUNCTIONS - ( 26 Jan 2021 14:01 )  Alb: 4.1 g/dL / Pro: 7.7 g/dL / ALK PHOS: 208 U/L / ALT: 33 U/L / AST: 27 U/L / GGT: x           PT/INR - ( 26 Jan 2021 14:01 )   PT: 13.0 sec;   INR: 1.09 ratio         PTT - ( 26 Jan 2021 14:01 )  PTT:28.9 sec          RADIOLOGY & ADDITIONAL TESTS:    Imaging Personally Reviewed:    Consultant(s) Notes Reviewed:      Care Discussed with Consultants/Other Providers:

## 2021-01-27 NOTE — PROGRESS NOTE ADULT - SUBJECTIVE AND OBJECTIVE BOX
Patient is a 89y old  Female who presents with a chief complaint of Rt flank pain x 5 days (27 Jan 2021 13:42)      INTERVAL HISTORY:     TELEMETRY Personally reviewed:    REVIEW OF SYSTEMS:   CONSTITUTIONAL: No weakness  EYES/ENT: No visual changes; No throat pain  Neck: No pain or stiffness  Respiratory: No cough, wheezing, No shortness of breath  CARDIOVASCULAR: no chest pain or palpitations  GASTROINTESTINAL: No abdominal pain, no nausea, vomiting or hematemesis  GENITOURINARY: No dysuria, frequency or hematuria  NEUROLOGICAL: No stroke like symptoms  SKIN: No rashes    	  MEDICATIONS:  metoprolol tartrate 12.5 milliGRAM(s) Oral two times a day    PHYSICAL EXAM:  T(C): 36.7 (01-27-21 @ 08:12), Max: 37 (01-27-21 @ 04:06)  HR: 79 (01-27-21 @ 08:12) (79 - 95)  BP: 128/68 (01-27-21 @ 08:12) (110/60 - 156/76)  RR: 18 (01-27-21 @ 08:12) (16 - 18)  SpO2: 97% (01-27-21 @ 08:12) (97% - 100%)  Wt(kg): --  I&O's Summary    Height (cm): 170.2 (01-26 @ 18:34)  Weight (kg): 91 (01-26 @ 18:34)  BMI (kg/m2): 31.4 (01-26 @ 18:34)  BSA (m2): 2.02 (01-26 @ 18:34)    Appearance: In no distress	  HEENT:    PERRL, EOMI	  Cardiovascular:  S1 S2, No JVD  Respiratory: Lungs clear to auscultation	  Gastrointestinal:  Soft, Non-tender, + BS	  Vascularature:  No edema of LE  Psychiatric: Appropriate affect   Neuro: no acute focal deficits                               12.6   10.31 )-----------( 417      ( 26 Jan 2021 14:01 )             39.0     01-26    135  |  99  |  20  ----------------------------<  187<H>  4.2   |  22  |  0.96    Ca    9.9      26 Jan 2021 14:01    TPro  7.7  /  Alb  4.1  /  TBili  0.8  /  DBili  x   /  AST  27  /  ALT  33  /  AlkPhos  208<H>  01-26  Labs personally reviewed    ASSESSMENT/PLAN:         Kenzie Mcpherson DO [facility]  Cardiovascular Medicine  [address] [place] [address], [address]  Office: [phone]  Cell: [phone] Patient is a 89y old  Female who presents with a chief complaint of Rt flank pain x 5 days (27 Jan 2021 13:42)      INTERVAL HISTORY: feels ok    TELEMETRY Personally reviewed:    REVIEW OF SYSTEMS:   CONSTITUTIONAL: No weakness  EYES/ENT: No visual changes; No throat pain  Neck: No pain or stiffness  Respiratory: No cough, wheezing, No shortness of breath  CARDIOVASCULAR: no chest pain or palpitations  GASTROINTESTINAL: No abdominal pain, no nausea, vomiting or hematemesis  GENITOURINARY: No dysuria, frequency or hematuria  NEUROLOGICAL: No stroke like symptoms  SKIN: No rashes    	  MEDICATIONS:  metoprolol tartrate 12.5 milliGRAM(s) Oral two times a day    PHYSICAL EXAM:  T(C): 36.7 (01-27-21 @ 08:12), Max: 37 (01-27-21 @ 04:06)  HR: 79 (01-27-21 @ 08:12) (79 - 95)  BP: 128/68 (01-27-21 @ 08:12) (110/60 - 156/76)  RR: 18 (01-27-21 @ 08:12) (16 - 18)  SpO2: 97% (01-27-21 @ 08:12) (97% - 100%)  Wt(kg): --  I&O's Summary    Height (cm): 170.2 (01-26 @ 18:34)  Weight (kg): 91 (01-26 @ 18:34)  BMI (kg/m2): 31.4 (01-26 @ 18:34)  BSA (m2): 2.02 (01-26 @ 18:34)    Appearance: In no distress	  HEENT:    PERRL, EOMI	  Cardiovascular:  S1 S2, No JVD  Respiratory: Lungs clear to auscultation	  Gastrointestinal:  Soft, Non-tender, + BS	  Vascularature:  No edema of LE  Psychiatric: Appropriate affect   Neuro: no acute focal deficits                               12.6   10.31 )-----------( 417      ( 26 Jan 2021 14:01 )             39.0     01-26    135  |  99  |  20  ----------------------------<  187<H>  4.2   |  22  |  0.96    Ca    9.9      26 Jan 2021 14:01    TPro  7.7  /  Alb  4.1  /  TBili  0.8  /  DBili  x   /  AST  27  /  ALT  33  /  AlkPhos  208<H>  01-26  Labs personally reviewed        Assessment /Plan:   Assessment:  · Assessment	   89 yo female with pmhx of HTN, HLD, NIDDM who presents to ED with complaint of R flank/R lumbar triangle pain       Problem/Plan - 1:  ·  Problem: SVT (supraventricular tachycardia).  Plan: Converted to sinus s/p iv hydration. will Monitor on tele.     Start patient on Beta blockers low dose and Eliquis until clarified if AF or not  initial EKG not found but ER documents AF  - EP consult appreciated  - tele and outpt monitor   - TTE     Problem/Plan - 2:  ·  Problem: Benign essential HTN.  Plan: Hold Enalapril and Amlodipine for now.   Monitor blood pressure off meds.      Problem/Plan - 3:  ·  Problem: Type 2 diabetes mellitus without complication, without long-term current use of insulin.  Plan: HISS follow up A1C.      Problem/Plan - 4:  ·  Problem: Pyelonephritis.  Plan: Acute pyelonephritis- Continue with Ceftriaxone, follow up urine and blood cultures.   CT abdomen shows moderate hydronephrosis- urology consul          Kenzie Mcpherson DO [facility]  Cardiovascular Medicine  [address], [address]  Office: [phone]  Cell: [phone] Patient is a 89y old  Female who presents with a chief complaint of Rt flank pain x 5 days (27 Jan 2021 13:42)      INTERVAL HISTORY: feels tired, wants to be on a more comfortable bed     TELEMETRY Personally reviewed: NSR with 1st degree avb- HR 90's     REVIEW OF SYSTEMS:   CONSTITUTIONAL: No weakness  EYES/ENT: No visual changes; No throat pain  Neck: No pain or stiffness  Respiratory: No cough, wheezing, No shortness of breath  CARDIOVASCULAR: no chest pain or palpitations  GASTROINTESTINAL: No abdominal pain, no nausea, vomiting or hematemesis  GENITOURINARY: No dysuria, frequency or hematuria  NEUROLOGICAL: No stroke like symptoms  SKIN: No rashes    	  MEDICATIONS:  metoprolol tartrate 12.5 milliGRAM(s) Oral two times a day    PHYSICAL EXAM:  T(C): 36.7 (01-27-21 @ 08:12), Max: 37 (01-27-21 @ 04:06)  HR: 79 (01-27-21 @ 08:12) (79 - 95)  BP: 128/68 (01-27-21 @ 08:12) (110/60 - 156/76)  RR: 18 (01-27-21 @ 08:12) (16 - 18)  SpO2: 97% (01-27-21 @ 08:12) (97% - 100%)  Wt(kg): --  I&O's Summary    Height (cm): 170.2 (01-26 @ 18:34)  Weight (kg): 91 (01-26 @ 18:34)  BMI (kg/m2): 31.4 (01-26 @ 18:34)  BSA (m2): 2.02 (01-26 @ 18:34)    Appearance: In no distress	  HEENT:    PERRL, EOMI	  Cardiovascular:  S1 S2, No JVD  Respiratory: Lungs clear to auscultation	  Gastrointestinal:  Soft, Non-tender, + BS	  Vascularature:  No edema of LE  Psychiatric: Appropriate affect   Neuro: no acute focal deficits                               12.6   10.31 )-----------( 417      ( 26 Jan 2021 14:01 )             39.0     01-26    135  |  99  |  20  ----------------------------<  187<H>  4.2   |  22  |  0.96    Ca    9.9      26 Jan 2021 14:01    TPro  7.7  /  Alb  4.1  /  TBili  0.8  /  DBili  x   /  AST  27  /  ALT  33  /  AlkPhos  208<H>  01-26  Labs personally reviewed        Assessment /Plan:   Assessment:  · Assessment	   89 yo female with pmhx of HTN, HLD, NIDDM who presents to ED with complaint of R flank/R lumbar triangle pain       Problem/Plan - 1:  ·  Problem: SVT (supraventricular tachycardia).  Plan: Converted to sinus s/p iv hydration. will Monitor on tele.     Start patient on Beta blockers low dose and Eliquis until clarified if AF or not  initial EKG not found but ER documents AF  - EP consult appreciated  - tele and outpt monitor   - TTE     Problem/Plan - 2:  ·  Problem: Benign essential HTN.  Plan: Hold Enalapril and Amlodipine for now.   Monitor blood pressure off meds.      Problem/Plan - 3:  ·  Problem: Type 2 diabetes mellitus without complication, without long-term current use of insulin.  Plan: HISS follow up A1C.      Problem/Plan - 4:  ·  Problem: Pyelonephritis.  Plan: Acute pyelonephritis- Continue with Ceftriaxone, follow up urine and blood cultures.   CT abdomen shows moderate hydronephrosis- urology consul          Kenzie Mcpherson DO [facility]  Cardiovascular Medicine  [address] [place] [address], [address]  Office: [phone]  Cell: [phone]

## 2021-01-28 LAB
-  AMIKACIN: SIGNIFICANT CHANGE UP
-  AMOXICILLIN/CLAVULANIC ACID: SIGNIFICANT CHANGE UP
-  AMPICILLIN/SULBACTAM: SIGNIFICANT CHANGE UP
-  AMPICILLIN: SIGNIFICANT CHANGE UP
-  AZTREONAM: SIGNIFICANT CHANGE UP
-  CEFAZOLIN: SIGNIFICANT CHANGE UP
-  CEFEPIME: SIGNIFICANT CHANGE UP
-  CEFOXITIN: SIGNIFICANT CHANGE UP
-  CEFTRIAXONE: SIGNIFICANT CHANGE UP
-  CIPROFLOXACIN: SIGNIFICANT CHANGE UP
-  ERTAPENEM: SIGNIFICANT CHANGE UP
-  GENTAMICIN: SIGNIFICANT CHANGE UP
-  IMIPENEM: SIGNIFICANT CHANGE UP
-  LEVOFLOXACIN: SIGNIFICANT CHANGE UP
-  MEROPENEM: SIGNIFICANT CHANGE UP
-  NITROFURANTOIN: SIGNIFICANT CHANGE UP
-  PIPERACILLIN/TAZOBACTAM: SIGNIFICANT CHANGE UP
-  TIGECYCLINE: SIGNIFICANT CHANGE UP
-  TOBRAMYCIN: SIGNIFICANT CHANGE UP
-  TRIMETHOPRIM/SULFAMETHOXAZOLE: SIGNIFICANT CHANGE UP
ALBUMIN SERPL ELPH-MCNC: 3.8 G/DL — SIGNIFICANT CHANGE UP (ref 3.3–5)
ALP SERPL-CCNC: 162 U/L — HIGH (ref 40–120)
ALT FLD-CCNC: 22 U/L — SIGNIFICANT CHANGE UP (ref 10–45)
ANION GAP SERPL CALC-SCNC: 11 MMOL/L — SIGNIFICANT CHANGE UP (ref 5–17)
APTT BLD: 30.8 SEC — SIGNIFICANT CHANGE UP (ref 27.5–35.5)
AST SERPL-CCNC: 20 U/L — SIGNIFICANT CHANGE UP (ref 10–40)
BASOPHILS # BLD AUTO: 0.02 K/UL — SIGNIFICANT CHANGE UP (ref 0–0.2)
BASOPHILS NFR BLD AUTO: 0.3 % — SIGNIFICANT CHANGE UP (ref 0–2)
BILIRUB SERPL-MCNC: 0.5 MG/DL — SIGNIFICANT CHANGE UP (ref 0.2–1.2)
BUN SERPL-MCNC: 16 MG/DL — SIGNIFICANT CHANGE UP (ref 7–23)
CALCIUM SERPL-MCNC: 9.6 MG/DL — SIGNIFICANT CHANGE UP (ref 8.4–10.5)
CHLORIDE SERPL-SCNC: 104 MMOL/L — SIGNIFICANT CHANGE UP (ref 96–108)
CO2 SERPL-SCNC: 25 MMOL/L — SIGNIFICANT CHANGE UP (ref 22–31)
CREAT SERPL-MCNC: 0.85 MG/DL — SIGNIFICANT CHANGE UP (ref 0.5–1.3)
CULTURE RESULTS: SIGNIFICANT CHANGE UP
EOSINOPHIL # BLD AUTO: 0.23 K/UL — SIGNIFICANT CHANGE UP (ref 0–0.5)
EOSINOPHIL NFR BLD AUTO: 3.6 % — SIGNIFICANT CHANGE UP (ref 0–6)
GLUCOSE BLDC GLUCOMTR-MCNC: 146 MG/DL — HIGH (ref 70–99)
GLUCOSE BLDC GLUCOMTR-MCNC: 164 MG/DL — HIGH (ref 70–99)
GLUCOSE BLDC GLUCOMTR-MCNC: 186 MG/DL — HIGH (ref 70–99)
GLUCOSE BLDC GLUCOMTR-MCNC: 195 MG/DL — HIGH (ref 70–99)
GLUCOSE SERPL-MCNC: 180 MG/DL — HIGH (ref 70–99)
HCT VFR BLD CALC: 36.1 % — SIGNIFICANT CHANGE UP (ref 34.5–45)
HGB BLD-MCNC: 11.6 G/DL — SIGNIFICANT CHANGE UP (ref 11.5–15.5)
IMM GRANULOCYTES NFR BLD AUTO: 0.5 % — SIGNIFICANT CHANGE UP (ref 0–1.5)
INR BLD: 1.28 RATIO — HIGH (ref 0.88–1.16)
LYMPHOCYTES # BLD AUTO: 1.34 K/UL — SIGNIFICANT CHANGE UP (ref 1–3.3)
LYMPHOCYTES # BLD AUTO: 20.8 % — SIGNIFICANT CHANGE UP (ref 13–44)
MCHC RBC-ENTMCNC: 30.1 PG — SIGNIFICANT CHANGE UP (ref 27–34)
MCHC RBC-ENTMCNC: 32.1 GM/DL — SIGNIFICANT CHANGE UP (ref 32–36)
MCV RBC AUTO: 93.5 FL — SIGNIFICANT CHANGE UP (ref 80–100)
METHOD TYPE: SIGNIFICANT CHANGE UP
MONOCYTES # BLD AUTO: 0.6 K/UL — SIGNIFICANT CHANGE UP (ref 0–0.9)
MONOCYTES NFR BLD AUTO: 9.3 % — SIGNIFICANT CHANGE UP (ref 2–14)
NEUTROPHILS # BLD AUTO: 4.23 K/UL — SIGNIFICANT CHANGE UP (ref 1.8–7.4)
NEUTROPHILS NFR BLD AUTO: 65.5 % — SIGNIFICANT CHANGE UP (ref 43–77)
NRBC # BLD: 0 /100 WBCS — SIGNIFICANT CHANGE UP (ref 0–0)
ORGANISM # SPEC MICROSCOPIC CNT: SIGNIFICANT CHANGE UP
ORGANISM # SPEC MICROSCOPIC CNT: SIGNIFICANT CHANGE UP
PLATELET # BLD AUTO: 357 K/UL — SIGNIFICANT CHANGE UP (ref 150–400)
POTASSIUM SERPL-MCNC: 3.9 MMOL/L — SIGNIFICANT CHANGE UP (ref 3.5–5.3)
POTASSIUM SERPL-SCNC: 3.9 MMOL/L — SIGNIFICANT CHANGE UP (ref 3.5–5.3)
PROT SERPL-MCNC: 7.2 G/DL — SIGNIFICANT CHANGE UP (ref 6–8.3)
PROTHROM AB SERPL-ACNC: 15 SEC — HIGH (ref 10.6–13.6)
RBC # BLD: 3.86 M/UL — SIGNIFICANT CHANGE UP (ref 3.8–5.2)
RBC # FLD: 13 % — SIGNIFICANT CHANGE UP (ref 10.3–14.5)
SODIUM SERPL-SCNC: 140 MMOL/L — SIGNIFICANT CHANGE UP (ref 135–145)
SPECIMEN SOURCE: SIGNIFICANT CHANGE UP
WBC # BLD: 6.45 K/UL — SIGNIFICANT CHANGE UP (ref 3.8–10.5)
WBC # FLD AUTO: 6.45 K/UL — SIGNIFICANT CHANGE UP (ref 3.8–10.5)

## 2021-01-28 PROCEDURE — 93306 TTE W/DOPPLER COMPLETE: CPT | Mod: 26

## 2021-01-28 PROCEDURE — 99222 1ST HOSP IP/OBS MODERATE 55: CPT

## 2021-01-28 RX ORDER — METFORMIN HYDROCHLORIDE 850 MG/1
1 TABLET ORAL
Qty: 0 | Refills: 0 | DISCHARGE

## 2021-01-28 RX ORDER — ATORVASTATIN CALCIUM 80 MG/1
1 TABLET, FILM COATED ORAL
Qty: 0 | Refills: 0 | DISCHARGE

## 2021-01-28 RX ORDER — CHOLECALCIFEROL (VITAMIN D3) 125 MCG
0 CAPSULE ORAL
Qty: 0 | Refills: 0 | DISCHARGE

## 2021-01-28 RX ORDER — ACETAMINOPHEN 500 MG
0 TABLET ORAL
Qty: 0 | Refills: 0 | DISCHARGE

## 2021-01-28 RX ORDER — METFORMIN HYDROCHLORIDE 850 MG/1
2 TABLET ORAL
Qty: 0 | Refills: 0 | DISCHARGE

## 2021-01-28 RX ORDER — PREGABALIN 225 MG/1
0 CAPSULE ORAL
Qty: 0 | Refills: 0 | DISCHARGE

## 2021-01-28 RX ORDER — MECLIZINE HCL 12.5 MG
1 TABLET ORAL
Qty: 0 | Refills: 0 | DISCHARGE

## 2021-01-28 RX ADMIN — APIXABAN 5 MILLIGRAM(S): 2.5 TABLET, FILM COATED ORAL at 17:44

## 2021-01-28 RX ADMIN — Medication 12.5 MILLIGRAM(S): at 05:47

## 2021-01-28 RX ADMIN — Medication 12.5 MILLIGRAM(S): at 17:44

## 2021-01-28 RX ADMIN — CEFTRIAXONE 100 MILLIGRAM(S): 500 INJECTION, POWDER, FOR SOLUTION INTRAMUSCULAR; INTRAVENOUS at 17:44

## 2021-01-28 RX ADMIN — Medication 1: at 08:09

## 2021-01-28 RX ADMIN — Medication 1: at 12:28

## 2021-01-28 RX ADMIN — APIXABAN 5 MILLIGRAM(S): 2.5 TABLET, FILM COATED ORAL at 05:47

## 2021-01-28 NOTE — CONSULT NOTE ADULT - ASSESSMENT
89yo female admitted with right flank pain, likely secondary to pyelonephritis, now resolved since starting CTX   89yo female admitted with right flank pain, likely secondary to pyelonephritis, now resolved since starting CTX.  CT showed mild-moderate R hydroureter with enhancement of ureter/pelvis.  Hydro similar to previous imaging in 2005.      - Patient currently without pain, Cr normal, afebrile, hydro stable.    - No  intervention at this time indicated.   - UCx - EColi, f/u sensitivities, treat for pyelonephritis  - Monitor I&Os, PVR 0cc  - Analgesia prn    d/w Dr. Chandler

## 2021-01-28 NOTE — CONSULT NOTE ADULT - SUBJECTIVE AND OBJECTIVE BOX
HPI: 89yo female h/o htn, hld, IDDM admitted  with right flank with concern for pyelonephritis, also with SVT converted to sinus with hydration in ED. Pt states right flank pain started about 1 week ago and gradually got worse.  Pain radiates to right abdomen. Associated with mild nausea but no vomiting. Mild frequency. Denies fever/chills, hematuria, dysuria. STates shes had UTIs in past, only one this past year about 1-2 months ago which was treated.     CT showed right hydro similar to imaging done in . Pt cannot recall this or having followed up with urology before regarding this.   Urine cx resulted as >100k ecoli, blood cultures NGTD. Pt states pain has now resolved since being started on CTX.     PAST MEDICAL & SURGICAL HISTORY:  HLD (hyperlipidemia)    Benign essential HTN    DM (diabetes mellitus)    FAMILY HISTORY:    SOCIAL HISTORY:   Tobacco hx:    MEDICATIONS  (STANDING):  apixaban 5 milliGRAM(s) Oral two times a day  cefTRIAXone   IVPB 1000 milliGRAM(s) IV Intermittent every 24 hours  dextrose 40% Gel 15 Gram(s) Oral once  dextrose 5%. 1000 milliLiter(s) (50 mL/Hr) IV Continuous <Continuous>  dextrose 5%. 1000 milliLiter(s) (100 mL/Hr) IV Continuous <Continuous>  dextrose 50% Injectable 25 Gram(s) IV Push once  dextrose 50% Injectable 12.5 Gram(s) IV Push once  dextrose 50% Injectable 25 Gram(s) IV Push once  glucagon  Injectable 1 milliGRAM(s) IntraMuscular once  insulin lispro (ADMELOG) corrective regimen sliding scale   SubCutaneous three times a day before meals  insulin lispro (ADMELOG) corrective regimen sliding scale   SubCutaneous at bedtime  metoprolol tartrate 12.5 milliGRAM(s) Oral two times a day    MEDICATIONS  (PRN):    Allergies    No Known Allergies    Intolerances        REVIEW OF SYSTEMS: Pertinent positives and negatives as stated in HPI, otherwise negative    Vital signs  T(C): 36.6 (21 @ 09:50), Max: 37 (21 @ 20:51)  HR: 80 (21 @ 09:50)  BP: 158/83 (21 @ 09:50)  SpO2: 97% (21 @ 09:50)  Wt(kg): --    Output    UOP    Physical Exam  Gen: NAD  Pulm: No respiratory distress, no subcostal retractions  CV: RRR, no JVD  Abd: Soft, NT, ND, no CVAT  : primafit with concentrated dark yellow urine    LABS:       @ 06:06    WBC 6.45  / Hct 36.1  / SCr 0.85      @ 14:01    WBC 10.31 / Hct 39.0  / SCr 0.96         140  |  104  |  16  ----------------------------<  180<H>  3.9   |  25  |  0.85    Ca    9.6      2021 06:06    TPro  7.2  /  Alb  3.8  /  TBili  0.5  /  DBili  x   /  AST  20  /  ALT  22  /  AlkPhos  162<H>      PT/INR - ( 2021 07:44 )   PT: 15.0 sec;   INR: 1.28 ratio         PTT - ( 2021 07:44 )  PTT:30.8 sec  Urinalysis Basic - ( 2021 14:33 )    Color: Light Yellow / Appearance: Turbid / S.016 / pH: x  Gluc: x / Ketone: Negative  / Bili: Negative / Urobili: Negative   Blood: x / Protein: 100 / Nitrite: Positive   Leuk Esterase: Large / RBC: 20 /hpf / WBC >50   Sq Epi: x / Non Sq Epi: 2 / Bacteria: Many        Urine Cx: ecoli  Blood Cx: NGTD    RADIOLOGY:  < from: CT Abdomen and Pelvis w/ IV Cont (21 @ 15:35) >  FINDINGS:  LOWER CHEST: Minimal bibasilar dependent atelectasis. Tubular branching structure in the left lower lobe (2:8).    LIVER: Within normal limits.  BILE DUCTS: Normal caliber.  GALLBLADDER: Cholelithiasis, including a stone within the probable phrygian cap..  SPLEEN: Within normal limits.  PANCREAS: Within normal limits.  ADRENALS: Within normal limits.  KIDNEYS/URETERS: Symmetric enhancement of the kidneys. Mild to moderate right hydronephrosis, similar in appearance to prior exam on 2005, with dilatation of the ureter to the mid pelvis. Urothelial enhancement is noted involving the right renal collecting system and right ureter.. Mild left pelvic fullness.    BLADDER: Within normal limits.  REPRODUCTIVE ORGANS: Hysterectomy.    BOWEL: No bowel obstruction. Tip of the appendix is again noted to be mildly distended and fluid-filled, measuring up to 1.2 cm, unchanged since 2005. No associated inflammatory changes are present. Colonic diverticulosis without acute diverticulitis.  PERITONEUM: Trace ascites.  VESSELS: Atherosclerotic calcification.  RETROPERITONEUM/LYMPH NODES: No lymphadenopathy.  ABDOMINAL WALL: Small fat-containing umbilical hernia. Postoperative changes in the anterior abdominal wall.  BONES: Multilevel degenerative changes of the spine. Grade 1 anterolisthesis of L3 on L4.    IMPRESSION:    Mild to moderate right hydronephrosis, with dilatation of the right ureter to the mid pelvis, similar to prior CT dated 2005.    Enhancement of the urothelium in the right renal collecting system and right ureter, suggestive of infection such as pyelitis.    Mildly dilated fluid-filled appendix measuring up to 1.2 cm in diameter, unchanged since 2005. A mucocele is again considered.          < end of copied text >

## 2021-01-28 NOTE — PHYSICAL THERAPY INITIAL EVALUATION ADULT - PRECAUTIONS/LIMITATIONS, REHAB EVAL
cardiac precautions/fall precautions Skin Substitute Text: The defect edges were debeveled with a #15 scalpel blade.  Given the location of the defect, shape of the defect and the proximity to free margins a skin substitute graft was deemed most appropriate.  The graft material was trimmed to fit the size of the defect. The graft was then placed in the primary defect and oriented appropriately.

## 2021-01-28 NOTE — PHYSICAL THERAPY INITIAL EVALUATION ADULT - ADDITIONAL COMMENTS
Pt lives in a pvt home has 1 DESMOND through the garage, was independent w/ all ADLs and functional mobility at baseline uses a RW for ambulation. Pt's son and daughter in law is able to assist as needed. Pt also owns a quad cane.

## 2021-01-28 NOTE — PHYSICAL THERAPY INITIAL EVALUATION ADULT - PERTINENT HX OF CURRENT PROBLEM, REHAB EVAL
89 yo female with pmhx of HTN, HLD, NIDDM who presents to ED with complaint of R flank/R lumbar triangle pain in setting of acute  pyelonephritis. In the ED 89 yo female with pmhx of HTN, HLD, NIDDM who presents to ED with complaint of R flank/R lumbar triangle pain in setting of acute  pyelonephritis. In the ED pt developed rapid narrow complex tachycardia in setting of AVNRT vs AF, now NSR. CTA&P: Mild to moderate right hydronephrosis, with dilatation of the right ureter to the mid pelvis.

## 2021-01-28 NOTE — CHART NOTE - NSCHARTNOTEFT_GEN_A_CORE
Episodic Note     Notified by the RN that patient is requesting Imodium but is having loose stools. When the stools were examined, the stools were slightly watery. Patient seen and examined at bedside. Patient denies any acute chest pain, paliptations, nausea, vomiting, or dysuria.     Vital Signs Last 24 Hrs  T(C): 37 (27 Jan 2021 20:51), Max: 37 (27 Jan 2021 04:06)  T(F): 98.6 (27 Jan 2021 20:51), Max: 98.6 (27 Jan 2021 04:06)  HR: 73 (27 Jan 2021 20:51) (73 - 94)  BP: 126/76 (27 Jan 2021 20:51) (126/76 - 156/76)  BP(mean): --  RR: 18 (27 Jan 2021 20:51) (18 - 18)  SpO2: 97% (27 Jan 2021 20:51) (96% - 97%)    PHYSICAL EXAM:  GENERAL: Well-developed  CHEST/LUNG: Clear to auscultation bilaterally; No wheeze  HEART: Regular rate and rhythm; No murmurs, rubs, or gallops  ABDOMEN: Soft, Nontender, Nondistended; Bowel sounds present  EXTREMITIES: Intact Peripheral Pulses, No clubbing, cyanosis, or edema  PSYCH: AAOx3  NEUROLOGY: non-focal    ASSESSMENT/PLAN:  HPI: 89 yo female with pmhx of HTN, HLD, NIDDM who presented to ED with complaint of R flank/R lumbar triangle pain. Now presents with stools slightly watery and loose.     1. IMPRESSION: stools slightly watery and loose  > C-difficile GDH + toxins A/B by EIA ordered.   > Will endorse to the oncoming Medicine ([facility]) day provider to f/u and manage accordingly.   > Continue to monitor bowel movements.   > Continue to monitor patient and vitals closely.   > F/u with the primary care team in the AM.     RN aware of the above management plan.     Ana Lilia Connor PA-C  Department of Medicine   #[id number]

## 2021-01-28 NOTE — PROGRESS NOTE ADULT - SUBJECTIVE AND OBJECTIVE BOX
Patient is a 89y old  Female who presents with a chief complaint of Rt flank pain x 5 days (28 Jan 2021 10:57)      INTERVAL HISTORY: patient feels  better- sitting up in recliner chair +  TELEMETRY Personally reviewed: NSR 80's    REVIEW OF SYSTEMS:   CONSTITUTIONAL: No weakness  EYES/ENT: No visual changes; No throat pain  Neck: No pain or stiffness  Respiratory: No cough, wheezing, No shortness of breath  CARDIOVASCULAR: no chest pain or palpitations  GASTROINTESTINAL: No abdominal pain, no nausea, vomiting or hematemesis  GENITOURINARY: No dysuria, frequency or hematuria  NEUROLOGICAL: No stroke like symptoms  SKIN: No rashes    	  MEDICATIONS:  metoprolol tartrate 12.5 milliGRAM(s) Oral two times a day        PHYSICAL EXAM:  T(C): 36.9 (01-28-21 @ 12:39), Max: 37 (01-27-21 @ 20:51)  HR: 94 (01-28-21 @ 12:39) (73 - 94)  BP: 158/85 (01-28-21 @ 12:39) (126/76 - 158/85)  RR: 18 (01-28-21 @ 12:39) (18 - 18)  SpO2: 97% (01-28-21 @ 12:39) (96% - 97%)  Wt(kg): --  I&O's Summary    27 Jan 2021 07:01  -  28 Jan 2021 07:00  --------------------------------------------------------  IN: 0 mL / OUT: 350 mL / NET: -350 mL    28 Jan 2021 07:01  -  28 Jan 2021 13:08  --------------------------------------------------------  IN: 720 mL / OUT: 0 mL / NET: 720 mL          Appearance: In no distress	  HEENT:    PERRL, EOMI	  Cardiovascular:  S1 S2, No JVD  Respiratory: Lungs clear to auscultation	  Gastrointestinal:  Soft, Non-tender, + BS	  Vascularature:  No edema of LE  Psychiatric: Appropriate affect   Neuro: no acute focal deficits                11.6   6.45  )-----------( 357      ( 28 Jan 2021 06:06 )             36.1     01-28    140  |  104  |  16  ----------------------------<  180<H>  3.9   |  25  |  0.85    Ca    9.6      28 Jan 2021 06:06    TPro  7.2  /  Alb  3.8  /  TBili  0.5  /  DBili  x   /  AST  20  /  ALT  22  /  AlkPhos  162<H>  01-28    Labs personally reviewed    ASSESSMENT/PLAN: 	    Assessment:  · Assessment	   89 yo female with pmhx of HTN, HLD, NIDDM who presents to ED with complaint of R flank/R lumbar triangle pain       Problem/Plan - 1:  ·  Problem: SVT (supraventricular tachycardia).  Plan: Converted to sinus s/p iv hydration. will Monitor on tele.     Start patient on Beta blockers low dose and Eliquis until clarified if AF or not  initial EKG not found but ER documents AF  - tele and outpt monitor   - TTE assess heart function and r/o WMA  - EP agree with plan above - waiting for echo     Problem/Plan - 2:  ·  Problem: Benign essential HTN.  Plan: Hold Enalapril and Amlodipine for now.   Monitor blood pressure off meds.      Problem/Plan - 3:  ·  Problem: Type 2 diabetes mellitus without complication, without long-term current use of insulin.  Plan: HISS follow up A1C.      Problem/Plan - 4:  ·  Problem: Pyelonephritis.  Plan: Acute pyelonephritis- Continue with Ceftriaxone, follow up urine and blood cultures.   CT abdomen shows moderate hydronephrosis- urology consul          Keznie Mcpherson DO [facility]  Cardiovascular Medicine  [address], [address]  Office: [phone]  Cell: [phone]

## 2021-01-28 NOTE — PHARMACOTHERAPY INTERVENTION NOTE - COMMENTS
Confirmed home medications with patient and [facility], updated in Outpatient Medication Review.     Counseled patient on new inpatient medications Eliquis and metoprolol - dosing, indications, and possible side effects. Provided and reviewed medication cards for each. Patient exhibited understanding.    Kerry Choi, PharmD, BCPS  [phone] Confirmed home medications with patient and [email or web address], updated in Outpatient Medication Review. Communicated with CHITRA Sung.    Counseled patient on new inpatient medications Eliquis and metoprolol - dosing, indications, and possible side effects. Provided and reviewed medication cards for each. Patient exhibited understanding.    Kerry Choi, PharmD, BCPS  [phone]

## 2021-01-28 NOTE — PROGRESS NOTE ADULT - SUBJECTIVE AND OBJECTIVE BOX
EP ATTENDING      HISTORY OF PRESENT ILLNESS: She is a 89 y/o female PMH HTN, DM, and hyperlipidemia a/w flank pain. A CT abdomen-pelvis shows a right hydro. EP is now called because while in the ER she developed a rapid narrow complex tachycardia that is very suspicious for typical AVNRT. There are no discernable p-waves, but the rhythm was too regular to suggest Afib. There is no 12-lead EKG, and I only have a 1-lead rhythm strip to review. She denies palpitations, syncope, nor angina. Echo pending. She is currently in NSR.    apixaban 5 milliGRAM(s) Oral two times a day  cefTRIAXone   IVPB 1000 milliGRAM(s) IV Intermittent every 24 hours  dextrose 40% Gel 15 Gram(s) Oral once  dextrose 5%. 1000 milliLiter(s) IV Continuous <Continuous>  dextrose 5%. 1000 milliLiter(s) IV Continuous <Continuous>  dextrose 50% Injectable 25 Gram(s) IV Push once  dextrose 50% Injectable 12.5 Gram(s) IV Push once  dextrose 50% Injectable 25 Gram(s) IV Push once  glucagon  Injectable 1 milliGRAM(s) IntraMuscular once  insulin lispro (ADMELOG) corrective regimen sliding scale   SubCutaneous three times a day before meals  insulin lispro (ADMELOG) corrective regimen sliding scale   SubCutaneous at bedtime  metoprolol tartrate 12.5 milliGRAM(s) Oral two times a day                            11.6   6.45  )-----------( 357      ( 28 Jan 2021 06:06 )             36.1       01-28    140  |  104  |  16  ----------------------------<  180<H>  3.9   |  25  |  0.85    Ca    9.6      28 Jan 2021 06:06    TPro  7.2  /  Alb  3.8  /  TBili  0.5  /  DBili  x   /  AST  20  /  ALT  22  /  AlkPhos  162<H>  01-28          T(C): 36.9 (01-28-21 @ 12:39), Max: 37 (01-27-21 @ 20:51)  HR: 88 (01-28-21 @ 15:09) (73 - 94)  BP: 158/87 (01-28-21 @ 15:09) (126/76 - 158/87)  RR: 18 (01-28-21 @ 12:39) (18 - 18)  SpO2: 98% (01-28-21 @ 15:09) (96% - 98%)  Wt(kg): --    I&O's Summary    27 Jan 2021 07:01  -  28 Jan 2021 07:00  --------------------------------------------------------  IN: 0 mL / OUT: 350 mL / NET: -350 mL    28 Jan 2021 07:01  -  28 Jan 2021 16:06  --------------------------------------------------------  IN: 720 mL / OUT: 0 mL / NET: 720 mL        Appearance: Normal	  HEENT:   Normal oral mucosa, PERRL, EOMI	  Lymphatic: No lymphadenopathy , no edema  Cardiovascular: Normal S1 S2, No JVD, No murmurs , Peripheral pulses palpable 2+ bilaterally  Respiratory: Lungs clear to auscultation, normal effort 	  Gastrointestinal:  Soft, Non-tender, + BS	  Skin: No rashes, No ecchymoses, No cyanosis, warm to touch  Musculoskeletal: Normal range of motion, normal strength  Psychiatry:  Mood & affect appropriate    Telemetry:      A/P) She is a 89 y/o female PMH HTN, DM, and hyperlipidemia a/w flank pain. A CT abdomen-pelvis shows a right hydro. EP is now called because while in the ER she developed a rapid narrow complex tachycardia that is very suspicious for typical AVNRT. There are no discernable p-waves, but the rhythm was too regular to suggest Afib. There is no 12-lead EKG, and I only have a 1-lead rhythm strip to review.  She denies palpitations, syncope, nor angina. Echo pending. She is currently in NSR.    -admit to telemetry  -get echo r/o structural heart disease  -agree with metoprolol  -continue eliquis for now until a final diagnosis is established  -final EP reccs pending echo and tele  -if an exact diagnosis (AVNRT vs AF) is not made during this hospitalization recommend outpatient event monitoring  -will follow      Yvonne Garcia M.D., [facility]  Cardiac Electrophysiology  [facility]  [address] [place] [address], [address] [place] [address]  [email or web address]    office [phone]  pager [phone]

## 2021-01-29 LAB
ALBUMIN SERPL ELPH-MCNC: 3.7 G/DL — SIGNIFICANT CHANGE UP (ref 3.3–5)
ALP SERPL-CCNC: 143 U/L — HIGH (ref 40–120)
ALT FLD-CCNC: 24 U/L — SIGNIFICANT CHANGE UP (ref 10–45)
ANION GAP SERPL CALC-SCNC: 12 MMOL/L — SIGNIFICANT CHANGE UP (ref 5–17)
AST SERPL-CCNC: 22 U/L — SIGNIFICANT CHANGE UP (ref 10–40)
BILIRUB SERPL-MCNC: 0.4 MG/DL — SIGNIFICANT CHANGE UP (ref 0.2–1.2)
BUN SERPL-MCNC: 20 MG/DL — SIGNIFICANT CHANGE UP (ref 7–23)
CALCIUM SERPL-MCNC: 9.4 MG/DL — SIGNIFICANT CHANGE UP (ref 8.4–10.5)
CHLORIDE SERPL-SCNC: 103 MMOL/L — SIGNIFICANT CHANGE UP (ref 96–108)
CO2 SERPL-SCNC: 23 MMOL/L — SIGNIFICANT CHANGE UP (ref 22–31)
CREAT SERPL-MCNC: 0.93 MG/DL — SIGNIFICANT CHANGE UP (ref 0.5–1.3)
GLUCOSE BLDC GLUCOMTR-MCNC: 152 MG/DL — HIGH (ref 70–99)
GLUCOSE BLDC GLUCOMTR-MCNC: 170 MG/DL — HIGH (ref 70–99)
GLUCOSE BLDC GLUCOMTR-MCNC: 176 MG/DL — HIGH (ref 70–99)
GLUCOSE BLDC GLUCOMTR-MCNC: 191 MG/DL — HIGH (ref 70–99)
GLUCOSE SERPL-MCNC: 176 MG/DL — HIGH (ref 70–99)
HCT VFR BLD CALC: 34.3 % — LOW (ref 34.5–45)
HGB BLD-MCNC: 10.8 G/DL — LOW (ref 11.5–15.5)
MAGNESIUM SERPL-MCNC: 2 MG/DL — SIGNIFICANT CHANGE UP (ref 1.6–2.6)
MCHC RBC-ENTMCNC: 29.7 PG — SIGNIFICANT CHANGE UP (ref 27–34)
MCHC RBC-ENTMCNC: 31.5 GM/DL — LOW (ref 32–36)
MCV RBC AUTO: 94.2 FL — SIGNIFICANT CHANGE UP (ref 80–100)
NRBC # BLD: 0 /100 WBCS — SIGNIFICANT CHANGE UP (ref 0–0)
PHOSPHATE SERPL-MCNC: 3.3 MG/DL — SIGNIFICANT CHANGE UP (ref 2.5–4.5)
PLATELET # BLD AUTO: 362 K/UL — SIGNIFICANT CHANGE UP (ref 150–400)
POTASSIUM SERPL-MCNC: 4 MMOL/L — SIGNIFICANT CHANGE UP (ref 3.5–5.3)
POTASSIUM SERPL-SCNC: 4 MMOL/L — SIGNIFICANT CHANGE UP (ref 3.5–5.3)
PROT SERPL-MCNC: 6.7 G/DL — SIGNIFICANT CHANGE UP (ref 6–8.3)
RBC # BLD: 3.64 M/UL — LOW (ref 3.8–5.2)
RBC # FLD: 12.8 % — SIGNIFICANT CHANGE UP (ref 10.3–14.5)
SODIUM SERPL-SCNC: 138 MMOL/L — SIGNIFICANT CHANGE UP (ref 135–145)
WBC # BLD: 7.08 K/UL — SIGNIFICANT CHANGE UP (ref 3.8–10.5)
WBC # FLD AUTO: 7.08 K/UL — SIGNIFICANT CHANGE UP (ref 3.8–10.5)

## 2021-01-29 RX ORDER — LANOLIN ALCOHOL/MO/W.PET/CERES
3 CREAM (GRAM) TOPICAL ONCE
Refills: 0 | Status: COMPLETED | OUTPATIENT
Start: 2021-01-29 | End: 2021-01-29

## 2021-01-29 RX ORDER — ACETAMINOPHEN 500 MG
650 TABLET ORAL ONCE
Refills: 0 | Status: COMPLETED | OUTPATIENT
Start: 2021-01-29 | End: 2021-01-29

## 2021-01-29 RX ADMIN — Medication 12.5 MILLIGRAM(S): at 17:32

## 2021-01-29 RX ADMIN — APIXABAN 5 MILLIGRAM(S): 2.5 TABLET, FILM COATED ORAL at 05:42

## 2021-01-29 RX ADMIN — Medication 1: at 17:32

## 2021-01-29 RX ADMIN — Medication 650 MILLIGRAM(S): at 22:22

## 2021-01-29 RX ADMIN — Medication 1: at 08:50

## 2021-01-29 RX ADMIN — Medication 3 MILLIGRAM(S): at 22:22

## 2021-01-29 RX ADMIN — Medication 1: at 12:07

## 2021-01-29 RX ADMIN — Medication 12.5 MILLIGRAM(S): at 05:42

## 2021-01-29 NOTE — PROGRESS NOTE ADULT - SUBJECTIVE AND OBJECTIVE BOX
Patient is a 89y old  Female who presents with a chief complaint of Rt flank pain x 5 days (27 Jan 2021 15:48)      SUBJECTIVE / OVERNIGHT EVENTS: no events over night   patient sitting in chair, no complaints     T(C): 36.7 (01-29-21 @ 13:01), Max: 36.7 (01-29-21 @ 13:01)  HR: 90 (01-29-21 @ 13:01) (82 - 90)  BP: 137/77 (01-29-21 @ 13:01) (137/77 - 143/72)  RR: 18 (01-29-21 @ 13:01) (18 - 18)  SpO2: 96% (01-29-21 @ 13:01) (96% - 97%)      MEDICATIONS  (STANDING):  apixaban 5 milliGRAM(s) Oral two times a day  dextrose 40% Gel 15 Gram(s) Oral once  dextrose 5%. 1000 milliLiter(s) (50 mL/Hr) IV Continuous <Continuous>  dextrose 5%. 1000 milliLiter(s) (100 mL/Hr) IV Continuous <Continuous>  dextrose 50% Injectable 25 Gram(s) IV Push once  dextrose 50% Injectable 12.5 Gram(s) IV Push once  dextrose 50% Injectable 25 Gram(s) IV Push once  glucagon  Injectable 1 milliGRAM(s) IntraMuscular once  insulin lispro (ADMELOG) corrective regimen sliding scale   SubCutaneous three times a day before meals  insulin lispro (ADMELOG) corrective regimen sliding scale   SubCutaneous at bedtime  metoprolol tartrate 12.5 milliGRAM(s) Oral two times a day    MEDICATIONS  (PRN):      PHYSICAL EXAM:  GENERAL: NAD, well-developed  HEAD:  Atraumatic, Normocephalic  EYES: EOMI, PERRLA, conjunctiva and sclera clear  NECK: Supple, No JVD  CHEST/LUNG: Clear to auscultation bilaterally; No wheeze  HEART: Regular rate and rhythm; No murmurs, rubs, or gallops  ABDOMEN: Soft, Nontender, Nondistended; Bowel sounds present  EXTREMITIES:  2+ Peripheral Pulses, No clubbing, cyanosis, or edema  PSYCH: AAOx3  NEUROLOGY: non-focal  SKIN: No rashes or lesions                            10.8   7.08  )-----------( 362      ( 29 Jan 2021 06:12 )             34.3           LIVER FUNCTIONS - ( 29 Jan 2021 06:12 )  Alb: 3.7 g/dL / Pro: 6.7 g/dL / ALK PHOS: 143 U/L / ALT: 24 U/L / AST: 22 U/L / GGT: x           PT/INR - ( 28 Jan 2021 07:44 )   PT: 15.0 sec;   INR: 1.28 ratio         PTT - ( 28 Jan 2021 07:44 )  PTT:30.8 sec  138|103|20<176  4.0|23|0.93  9.4,2.0,3.3  01-29 @ 06:12                          RADIOLOGY & ADDITIONAL TESTS:    Imaging Personally Reviewed:    Consultant(s) Notes Reviewed:    CAPILLARY BLOOD GLUCOSE    CAPILLARY BLOOD GLUCOSE      POCT Blood Glucose.: 170 mg/dL (29 Jan 2021 11:50)  POCT Blood Glucose.: 152 mg/dL (29 Jan 2021 07:54)  POCT Blood Glucose.: 164 mg/dL (28 Jan 2021 21:21)  POCT Blood Glucose.: 146 mg/dL (28 Jan 2021 17:34)    Care Discussed with Consultants/Other Providers:

## 2021-01-29 NOTE — PROGRESS NOTE ADULT - PROBLEM SELECTOR PLAN 2
Acute pyelonephritis- Continue with Ceftriaxone, follow up urine and blood cultures.   CT abdomen shows moderate hydronephrosis. Acute pyelonephritis- Continue with Ceftriaxone, urine cultures show E.coli , follow up specification. Blood cultures no growth to date.    CT abdomen shows moderate hydronephrosis.

## 2021-01-29 NOTE — PROGRESS NOTE ADULT - PROBLEM SELECTOR PLAN 4
Hold Enalapril and Amlodipine for now.   Monitor blood pressure off meds.
Hold Enalapril and Amlodipine for now.   Monitor blood pressure off meds.

## 2021-01-29 NOTE — PROGRESS NOTE ADULT - SUBJECTIVE AND OBJECTIVE BOX
Patient is a 89y old  Female who presents with a chief complaint of Rt flank pain x 5 days (28 Jan 2021 16:06)      INTERVAL HISTORY:  feels ok    TELEMETRY Personally reviewed: NSR 70's     REVIEW OF SYSTEMS:   CONSTITUTIONAL: No weakness  EYES/ENT: No visual changes; No throat pain  Neck: No pain or stiffness  Respiratory: No cough, wheezing, No shortness of breath  CARDIOVASCULAR: no chest pain or palpitations  GASTROINTESTINAL: No abdominal pain, no nausea, vomiting or hematemesis  GENITOURINARY: No dysuria, frequency or hematuria  NEUROLOGICAL: No stroke like symptoms  SKIN: No rashes  	  MEDICATIONS:  metoprolol tartrate 12.5 milliGRAM(s) Oral two times a day    PHYSICAL EXAM:  T(C): 36.6 (01-29-21 @ 05:28), Max: 36.9 (01-28-21 @ 12:39)  HR: 82 (01-29-21 @ 05:28) (78 - 94)  BP: 143/72 (01-29-21 @ 05:28) (143/72 - 158/87)  RR: 18 (01-29-21 @ 05:28) (18 - 18)  SpO2: 97% (01-29-21 @ 05:28) (97% - 98%)  Wt(kg): --  I&O's Summary    28 Jan 2021 07:01  -  29 Jan 2021 07:00  --------------------------------------------------------  IN: 1130 mL / OUT: 900 mL / NET: 230 mL      Appearance: In no distress	  HEENT:    PERRL, EOMI	  Cardiovascular:  S1 S2, No JVD  Respiratory: Lungs clear to auscultation	  Gastrointestinal:  Soft, Non-tender, + BS	  Vascularature:  No edema of LE  Psychiatric: Appropriate affect   Neuro: no acute focal deficits                         10.8   7.08  )-----------( 362      ( 29 Jan 2021 06:12 )             34.3     01-29    138  |  103  |  20  ----------------------------<  176<H>  4.0   |  23  |  0.93    Ca    9.4      29 Jan 2021 06:12  Phos  3.3     01-29  Mg     2.0     01-29    TPro  6.7  /  Alb  3.7  /  TBili  0.4  /  DBili  x   /  AST  22  /  ALT  24  /  AlkPhos  143<H>  01-29    Labs personally reviewed  < from: Transthoracic Echocardiogram (01.28.21 @ 19:48) >  Conclusions:  1. Mitral annular calcification and calcified mitral  leaflets with normal diastolic opening. Mild mitral  regurgitation.  Peak mitral valve gradient equals 7 mm Hg,  mean transmitral valve gradient equals 3 mm Hg, consistent  with mild mitral stenosis. (HR 76)  2. Aortic valve not well visualized; appears calcified.  Mild aortic regurgitation.  3. Increased relative wall thickness with normal left  ventricular mass index, consistent with concentric left  ventricular remodeling.  4. Normal left ventricular systolic function. No segmental  wall motion abnormalities.  5. Normal diastolic function  6. Normal right ventricular size and function.  7. Estimated pulmonary artery systolic pressure equals 33  mm Hg, assuming right atrial pressure equals 8  mm Hg,  consistent with normal pulmonary pressures.    < end of copied text >    ASSESSMENT/PLAN: 	   89 yo female with pmhx of HTN, HLD, NIDDM who presents to ED with complaint of R flank/R lumbar triangle pain       Problem/Plan - 1:  ·  Problem: SVT (supraventricular tachycardia).  Plan: Converted to sinus s/p iv hydration.   -c/w bb and Eliquis until clarified if AF or not  -initial EKG not found but ER documents AF  - TTE Ef 65-70% no WMA  - continue tele monitoring for now     Problem/Plan - 2:  ·  Problem: Benign essential HTN.  Plan: Hold Enalapril and Amlodipine for now.   Monitor blood pressure off meds.      Problem/Plan - 3:  ·  Problem: Type 2 diabetes mellitus without complication, without long-term current use of insulin.  Plan: HISS follow up A1C.      Problem/Plan - 4:  ·  Problem: Pyelonephritis.  Plan: Acute pyelonephritis- Continue with Ceftriaxone, follow up urine and blood cultures.   CT abdomen shows moderate hydronephrosis-   - per urology c/w iv abx for pyleneophritis and continue to monitor           Kenzie FREEDMAN-EUSEBIO Mcpherson DO [facility]  Cardiovascular Medicine  [address] [facility], [address]  Office: [phone]  Cell: [phone] Patient is a 89y old  Female who presents with a chief complaint of Rt flank pain x 5 days (28 Jan 2021 16:06)      INTERVAL HISTORY:  feels ok    TELEMETRY Personally reviewed: NSR 70's     REVIEW OF SYSTEMS:   CONSTITUTIONAL: No weakness  EYES/ENT: No visual changes; No throat pain  Neck: No pain or stiffness  Respiratory: No cough, wheezing, No shortness of breath  CARDIOVASCULAR: no chest pain or palpitations  GASTROINTESTINAL: No abdominal pain, no nausea, vomiting or hematemesis  GENITOURINARY: No dysuria, frequency or hematuria  NEUROLOGICAL: No stroke like symptoms  SKIN: No rashes  	  MEDICATIONS:  metoprolol tartrate 12.5 milliGRAM(s) Oral two times a day    PHYSICAL EXAM:  T(C): 36.6 (01-29-21 @ 05:28), Max: 36.9 (01-28-21 @ 12:39)  HR: 82 (01-29-21 @ 05:28) (78 - 94)  BP: 143/72 (01-29-21 @ 05:28) (143/72 - 158/87)  RR: 18 (01-29-21 @ 05:28) (18 - 18)  SpO2: 97% (01-29-21 @ 05:28) (97% - 98%)  Wt(kg): --  I&O's Summary    28 Jan 2021 07:01  -  29 Jan 2021 07:00  --------------------------------------------------------  IN: 1130 mL / OUT: 900 mL / NET: 230 mL      Appearance: In no distress	  HEENT:    PERRL, EOMI	  Cardiovascular:  S1 S2, No JVD  Respiratory: Lungs clear to auscultation	  Gastrointestinal:  Soft, Non-tender, + BS	  Vascularature:  No edema of LE  Psychiatric: Appropriate affect   Neuro: no acute focal deficits                         10.8   7.08  )-----------( 362      ( 29 Jan 2021 06:12 )             34.3     01-29    138  |  103  |  20  ----------------------------<  176<H>  4.0   |  23  |  0.93    Ca    9.4      29 Jan 2021 06:12  Phos  3.3     01-29  Mg     2.0     01-29    TPro  6.7  /  Alb  3.7  /  TBili  0.4  /  DBili  x   /  AST  22  /  ALT  24  /  AlkPhos  143<H>  01-29    Labs personally reviewed  < from: Transthoracic Echocardiogram (01.28.21 @ 19:48) >  Conclusions:  1. Mitral annular calcification and calcified mitral  leaflets with normal diastolic opening. Mild mitral  regurgitation.  Peak mitral valve gradient equals 7 mm Hg,  mean transmitral valve gradient equals 3 mm Hg, consistent  with mild mitral stenosis. (HR 76)  2. Aortic valve not well visualized; appears calcified.  Mild aortic regurgitation.  3. Increased relative wall thickness with normal left  ventricular mass index, consistent with concentric left  ventricular remodeling.  4. Normal left ventricular systolic function. No segmental  wall motion abnormalities.  5. Normal diastolic function  6. Normal right ventricular size and function.  7. Estimated pulmonary artery systolic pressure equals 33  mm Hg, assuming right atrial pressure equals 8  mm Hg,  consistent with normal pulmonary pressures.    < end of copied text >    ASSESSMENT/PLAN: 	   89 yo female with pmhx of HTN, HLD, NIDDM who presents to ED with complaint of R flank/R lumbar triangle pain       Problem/Plan - 1:  ·  Problem: SVT (supraventricular tachycardia).  Plan: Converted to sinus s/p iv hydration.   -c/w bb   -initial EKG not found but ER documents AF  - TTE Ef 65-70% no WMA  - continue tele monitoring for now  - If EP agreeable will d/c Eliquis     Problem/Plan - 2:  ·  Problem: Benign essential HTN.  Plan: Hold Enalapril and Amlodipine for now.   Monitor blood pressure off meds.      Problem/Plan - 3:  ·  Problem: Type 2 diabetes mellitus without complication, without long-term current use of insulin.  Plan: HISS follow up A1C.      Problem/Plan - 4:  ·  Problem: Pyelonephritis.  Plan: Acute pyelonephritis- Continue with Ceftriaxone, follow up urine and blood cultures.   CT abdomen shows moderate hydronephrosis-   - per urology c/w iv abx for pyleneophritis and continue to monitor           Kenzie FREEDMAN-EUSEBIO Mcpherson DO [facility]  Cardiovascular Medicine  [address], [address]  Office: [phone]  Cell: [phone]

## 2021-01-29 NOTE — PROGRESS NOTE ADULT - PROBLEM SELECTOR PLAN 1
Converted to sinus s/p iv hydration. Monitor on tele.   Cards consult appreciated. - TTE Ef 65-70% no WMA.  Eliquis and Beta Blockers.
Converted to sinus s/p iv hydration. Monitor on tele.   Cards consult appreciated. Start patient on Beta blockers low dose and Eliquis as per cards.

## 2021-01-29 NOTE — PROGRESS NOTE ADULT - SUBJECTIVE AND OBJECTIVE BOX
EP ATTENDING    tele: NSR, first degree AV delay, no events    she denies palpitations, syncope, nor angina, ROS otherwise -      Review of Systems:   Constitutional: [ ] fevers, [ ] chills.   Skin: [ ] dry skin. [ ] rashes.  Psychiatric: [ ] depression, [ ] anxiety.   Gastrointestinal: [ ] BRBPR, [ ] melena.   Neurological: [ ] confusion. [ ] seizures. [ ] shuffling gait.   Ears,Nose,Mouth and Throat: [ ] ear pain [ ] sore throat.   Eyes: [ ] diplopia.   Respiratory: [ ] hemoptysis. [ ] shortness of breath  Cardiovascular: See HPI above  Hematologic/Lymphatic: [ ] anemia. [ ] painful nodes. [ ] prolonged bleeding.   Genitourinary: [ ] hematuria. [ ] flank pain.   Endocrine: [ ] significant change in weight. [ ] intolerance to heat and cold.     Review of systems [ x] otherwise negative, [ ] otherwise unable to obtain    FH: no family history of sudden cardiac death in first degree relatives    SH: [ ] tobacco, [ ] alcohol, [ ] drugs    dextrose 40% Gel 15 Gram(s) Oral once  dextrose 5%. 1000 milliLiter(s) IV Continuous <Continuous>  dextrose 5%. 1000 milliLiter(s) IV Continuous <Continuous>  dextrose 50% Injectable 25 Gram(s) IV Push once  dextrose 50% Injectable 12.5 Gram(s) IV Push once  dextrose 50% Injectable 25 Gram(s) IV Push once  glucagon  Injectable 1 milliGRAM(s) IntraMuscular once  insulin lispro (ADMELOG) corrective regimen sliding scale   SubCutaneous three times a day before meals  insulin lispro (ADMELOG) corrective regimen sliding scale   SubCutaneous at bedtime  metoprolol tartrate 12.5 milliGRAM(s) Oral two times a day                            10.8   7.08  )-----------( 362      ( 29 Jan 2021 06:12 )             34.3       01-29    138  |  103  |  20  ----------------------------<  176<H>  4.0   |  23  |  0.93    Ca    9.4      29 Jan 2021 06:12  Phos  3.3     01-29  Mg     2.0     01-29    TPro  6.7  /  Alb  3.7  /  TBili  0.4  /  DBili  x   /  AST  22  /  ALT  24  /  AlkPhos  143<H>  01-29            T(C): 36.7 (01-29-21 @ 13:01), Max: 36.7 (01-28-21 @ 20:16)  HR: 90 (01-29-21 @ 13:01) (78 - 90)  BP: 137/77 (01-29-21 @ 13:01) (137/77 - 156/84)  RR: 18 (01-29-21 @ 13:01) (18 - 18)  SpO2: 96% (01-29-21 @ 13:01) (96% - 97%)  Wt(kg): --    I&O's Summary    28 Jan 2021 07:01  -  29 Jan 2021 07:00  --------------------------------------------------------  IN: 1130 mL / OUT: 900 mL / NET: 230 mL    29 Jan 2021 07:01  -  29 Jan 2021 15:41  --------------------------------------------------------  IN: 840 mL / OUT: 0 mL / NET: 840 mL        General: Well nourished in no acute distress. Alert and Oriented * 3.   Head: Normocephalic and atraumatic.   Neck: No JVD. No bruits. Supple. Does not appear to be enlarged.   Cardiovascular: + S1,S2 ; RRR Soft systolic murmur at the left lower sternal border. No rubs noted.    Lungs: CTA b/l. No rhonchi, rales or wheezes.   Abdomen: + BS, soft. Non tender. Non distended. No rebound. No guarding.   Extremities: No clubbing/cyanosis/edema.   Neurologic: Moves all four extremities. Full range of motion.   Skin: Warm and moist. The patient's skin has normal elasticity and good skin turgor.   Psychiatric: Appropriate mood and affect.  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 A CT abdomen-pelvis shows a right hydro. EP is now called because while in the ER she developed a rapid narrow complex tachycardia that is very suspicious for typical AVNRT. There are no discernable p-waves, but the rhythm was too regular to suggest Afib. The 12-lead EKG is now available, and is diagnostic for typical AVNRT. There is no AF. Echo normal. She is currently in NSR.    -d/c eliquis as there has been no evidence of Afib nor AFL  -agree with escalating doses of metoprolol  -d/c planning as per primary team  -no further inpatient EP workup needed  -if she fails medical therapy she's a good candidate for a outpatient AVNRT ablation      Yvonne Garcia M.D., [facility]  Cardiac Electrophysiology  [facility]  [address] [place] [address], [address] [place] [address]  [email or web address]    office [phone]  pager [phone]

## 2021-01-30 ENCOUNTER — TRANSCRIPTION ENCOUNTER (OUTPATIENT)
Age: 87
End: 2021-01-30

## 2021-01-30 VITALS — TEMPERATURE: 99 F

## 2021-01-30 LAB
ALBUMIN SERPL ELPH-MCNC: 3.6 G/DL — SIGNIFICANT CHANGE UP (ref 3.3–5)
ALP SERPL-CCNC: 138 U/L — HIGH (ref 40–120)
ALT FLD-CCNC: 23 U/L — SIGNIFICANT CHANGE UP (ref 10–45)
ANION GAP SERPL CALC-SCNC: 12 MMOL/L — SIGNIFICANT CHANGE UP (ref 5–17)
AST SERPL-CCNC: 21 U/L — SIGNIFICANT CHANGE UP (ref 10–40)
BASOPHILS # BLD AUTO: 0.04 K/UL — SIGNIFICANT CHANGE UP (ref 0–0.2)
BASOPHILS NFR BLD AUTO: 0.5 % — SIGNIFICANT CHANGE UP (ref 0–2)
BILIRUB SERPL-MCNC: 0.3 MG/DL — SIGNIFICANT CHANGE UP (ref 0.2–1.2)
BUN SERPL-MCNC: 27 MG/DL — HIGH (ref 7–23)
CALCIUM SERPL-MCNC: 9.6 MG/DL — SIGNIFICANT CHANGE UP (ref 8.4–10.5)
CHLORIDE SERPL-SCNC: 103 MMOL/L — SIGNIFICANT CHANGE UP (ref 96–108)
CO2 SERPL-SCNC: 22 MMOL/L — SIGNIFICANT CHANGE UP (ref 22–31)
CREAT SERPL-MCNC: 0.85 MG/DL — SIGNIFICANT CHANGE UP (ref 0.5–1.3)
EOSINOPHIL # BLD AUTO: 0.34 K/UL — SIGNIFICANT CHANGE UP (ref 0–0.5)
EOSINOPHIL NFR BLD AUTO: 4.2 % — SIGNIFICANT CHANGE UP (ref 0–6)
GLUCOSE BLDC GLUCOMTR-MCNC: 168 MG/DL — HIGH (ref 70–99)
GLUCOSE BLDC GLUCOMTR-MCNC: 189 MG/DL — HIGH (ref 70–99)
GLUCOSE SERPL-MCNC: 197 MG/DL — HIGH (ref 70–99)
HCT VFR BLD CALC: 35.1 % — SIGNIFICANT CHANGE UP (ref 34.5–45)
HGB BLD-MCNC: 11.4 G/DL — LOW (ref 11.5–15.5)
IMM GRANULOCYTES NFR BLD AUTO: 0.4 % — SIGNIFICANT CHANGE UP (ref 0–1.5)
LYMPHOCYTES # BLD AUTO: 1.61 K/UL — SIGNIFICANT CHANGE UP (ref 1–3.3)
LYMPHOCYTES # BLD AUTO: 19.9 % — SIGNIFICANT CHANGE UP (ref 13–44)
MAGNESIUM SERPL-MCNC: 1.8 MG/DL — SIGNIFICANT CHANGE UP (ref 1.6–2.6)
MCHC RBC-ENTMCNC: 30.3 PG — SIGNIFICANT CHANGE UP (ref 27–34)
MCHC RBC-ENTMCNC: 32.5 GM/DL — SIGNIFICANT CHANGE UP (ref 32–36)
MCV RBC AUTO: 93.4 FL — SIGNIFICANT CHANGE UP (ref 80–100)
MONOCYTES # BLD AUTO: 0.69 K/UL — SIGNIFICANT CHANGE UP (ref 0–0.9)
MONOCYTES NFR BLD AUTO: 8.5 % — SIGNIFICANT CHANGE UP (ref 2–14)
NEUTROPHILS # BLD AUTO: 5.38 K/UL — SIGNIFICANT CHANGE UP (ref 1.8–7.4)
NEUTROPHILS NFR BLD AUTO: 66.5 % — SIGNIFICANT CHANGE UP (ref 43–77)
NRBC # BLD: 0 /100 WBCS — SIGNIFICANT CHANGE UP (ref 0–0)
PHOSPHATE SERPL-MCNC: 3.4 MG/DL — SIGNIFICANT CHANGE UP (ref 2.5–4.5)
PLATELET # BLD AUTO: 382 K/UL — SIGNIFICANT CHANGE UP (ref 150–400)
POTASSIUM SERPL-MCNC: 3.9 MMOL/L — SIGNIFICANT CHANGE UP (ref 3.5–5.3)
POTASSIUM SERPL-SCNC: 3.9 MMOL/L — SIGNIFICANT CHANGE UP (ref 3.5–5.3)
PROT SERPL-MCNC: 6.3 G/DL — SIGNIFICANT CHANGE UP (ref 6–8.3)
RBC # BLD: 3.76 M/UL — LOW (ref 3.8–5.2)
RBC # FLD: 12.8 % — SIGNIFICANT CHANGE UP (ref 10.3–14.5)
SODIUM SERPL-SCNC: 137 MMOL/L — SIGNIFICANT CHANGE UP (ref 135–145)
WBC # BLD: 8.09 K/UL — SIGNIFICANT CHANGE UP (ref 3.8–10.5)
WBC # FLD AUTO: 8.09 K/UL — SIGNIFICANT CHANGE UP (ref 3.8–10.5)

## 2021-01-30 PROCEDURE — 85014 HEMATOCRIT: CPT

## 2021-01-30 PROCEDURE — 83735 ASSAY OF MAGNESIUM: CPT

## 2021-01-30 PROCEDURE — 83605 ASSAY OF LACTIC ACID: CPT

## 2021-01-30 PROCEDURE — 82803 BLOOD GASES ANY COMBINATION: CPT

## 2021-01-30 PROCEDURE — 97162 PT EVAL MOD COMPLEX 30 MIN: CPT

## 2021-01-30 PROCEDURE — 87086 URINE CULTURE/COLONY COUNT: CPT

## 2021-01-30 PROCEDURE — 83690 ASSAY OF LIPASE: CPT

## 2021-01-30 PROCEDURE — 86850 RBC ANTIBODY SCREEN: CPT

## 2021-01-30 PROCEDURE — 85027 COMPLETE CBC AUTOMATED: CPT

## 2021-01-30 PROCEDURE — 85025 COMPLETE CBC W/AUTO DIFF WBC: CPT

## 2021-01-30 PROCEDURE — 86769 SARS-COV-2 COVID-19 ANTIBODY: CPT

## 2021-01-30 PROCEDURE — 82330 ASSAY OF CALCIUM: CPT

## 2021-01-30 PROCEDURE — 84100 ASSAY OF PHOSPHORUS: CPT

## 2021-01-30 PROCEDURE — 86900 BLOOD TYPING SEROLOGIC ABO: CPT

## 2021-01-30 PROCEDURE — U0005: CPT

## 2021-01-30 PROCEDURE — 84132 ASSAY OF SERUM POTASSIUM: CPT

## 2021-01-30 PROCEDURE — 96360 HYDRATION IV INFUSION INIT: CPT | Mod: XU

## 2021-01-30 PROCEDURE — 87040 BLOOD CULTURE FOR BACTERIA: CPT

## 2021-01-30 PROCEDURE — 85018 HEMOGLOBIN: CPT

## 2021-01-30 PROCEDURE — 74177 CT ABD & PELVIS W/CONTRAST: CPT

## 2021-01-30 PROCEDURE — 80053 COMPREHEN METABOLIC PANEL: CPT

## 2021-01-30 PROCEDURE — 93306 TTE W/DOPPLER COMPLETE: CPT

## 2021-01-30 PROCEDURE — 99285 EMERGENCY DEPT VISIT HI MDM: CPT | Mod: 25

## 2021-01-30 PROCEDURE — 85610 PROTHROMBIN TIME: CPT

## 2021-01-30 PROCEDURE — 82435 ASSAY OF BLOOD CHLORIDE: CPT

## 2021-01-30 PROCEDURE — 87186 SC STD MICRODIL/AGAR DIL: CPT

## 2021-01-30 PROCEDURE — 82962 GLUCOSE BLOOD TEST: CPT

## 2021-01-30 PROCEDURE — 86901 BLOOD TYPING SEROLOGIC RH(D): CPT

## 2021-01-30 PROCEDURE — 51701 INSERT BLADDER CATHETER: CPT

## 2021-01-30 PROCEDURE — 84484 ASSAY OF TROPONIN QUANT: CPT

## 2021-01-30 PROCEDURE — 83036 HEMOGLOBIN GLYCOSYLATED A1C: CPT

## 2021-01-30 PROCEDURE — 85730 THROMBOPLASTIN TIME PARTIAL: CPT

## 2021-01-30 PROCEDURE — 81001 URINALYSIS AUTO W/SCOPE: CPT

## 2021-01-30 PROCEDURE — 82947 ASSAY GLUCOSE BLOOD QUANT: CPT

## 2021-01-30 PROCEDURE — U0003: CPT

## 2021-01-30 PROCEDURE — 93005 ELECTROCARDIOGRAM TRACING: CPT | Mod: XU

## 2021-01-30 PROCEDURE — 84295 ASSAY OF SERUM SODIUM: CPT

## 2021-01-30 RX ORDER — CEFUROXIME AXETIL 250 MG
1 TABLET ORAL
Qty: 20 | Refills: 0
Start: 2021-01-30 | End: 2021-02-08

## 2021-01-30 RX ORDER — METOPROLOL TARTRATE 50 MG
0.5 TABLET ORAL
Qty: 30 | Refills: 0
Start: 2021-01-30 | End: 2021-02-28

## 2021-01-30 RX ORDER — AMLODIPINE BESYLATE 2.5 MG/1
1 TABLET ORAL
Qty: 0 | Refills: 0 | DISCHARGE

## 2021-01-30 RX ORDER — METOPROLOL TARTRATE 50 MG
1 TABLET ORAL
Qty: 60 | Refills: 0
Start: 2021-01-30 | End: 2021-02-28

## 2021-01-30 RX ADMIN — Medication 12.5 MILLIGRAM(S): at 05:17

## 2021-01-30 RX ADMIN — Medication 1: at 12:39

## 2021-01-30 RX ADMIN — Medication 1: at 07:51

## 2021-01-30 NOTE — PROGRESS NOTE ADULT - REASON FOR ADMISSION
Rt flank pain x 5 days

## 2021-01-30 NOTE — DISCHARGE NOTE PROVIDER - CARE PROVIDER_API CALL
Mustapha Mcpherson (DO)  Cardiovascular Disease; Internal Medicine; Nuclear Cardiology  [address] [place] [address], [place] [address]  Phone: [phone]  Fax: [phone]  Follow Up Time:

## 2021-01-30 NOTE — PROGRESS NOTE ADULT - SUBJECTIVE AND OBJECTIVE BOX
Patient is a 89y old  Female who presents with a chief complaint of Rt flank pain x 5 days (29 Jan 2021 15:40)      INTERVAL HISTORY: feels ok     REVIEW OF SYSTEMS:   CONSTITUTIONAL: No fever  EYES: No eye pain, visual disturbances, or discharge  ENT:  No difficulty hearing, tinnitus  NECK: No pain or stiffness  RESPIRATORY: No cough, wheezing,  CARDIOVASCULAR: No chest pain, palpitations, passing out, dizziness, or leg swelling  GASTROINTESTINAL:  No nausea, vomiting, diarrhea or constipation. No melena.  GENITOURINARY: No dysuria, hematuria  NEUROLOGICAL: No stroke like symptoms  SKIN: No burning or lesions   ENDOCRINE: No heat or cold intolerance  MUSCULOSKELETAL: No joint pain or swelling  PSYCHIATRIC: No  anxiety, mood swings  HEME/LYMPH: No bleeding gums  ALLERGY AND IMMUNOLOGIC: No hives or eczema	    All other ROS negative    TELEMETRY Personally reviewed:  	  MEDICATIONS  (STANDING):  dextrose 40% Gel 15 Gram(s) Oral once  dextrose 5%. 1000 milliLiter(s) (50 mL/Hr) IV Continuous <Continuous>  dextrose 5%. 1000 milliLiter(s) (100 mL/Hr) IV Continuous <Continuous>  dextrose 50% Injectable 25 Gram(s) IV Push once  dextrose 50% Injectable 12.5 Gram(s) IV Push once  dextrose 50% Injectable 25 Gram(s) IV Push once  glucagon  Injectable 1 milliGRAM(s) IntraMuscular once  insulin lispro (ADMELOG) corrective regimen sliding scale   SubCutaneous three times a day before meals  insulin lispro (ADMELOG) corrective regimen sliding scale   SubCutaneous at bedtime  metoprolol tartrate 12.5 milliGRAM(s) Oral two times a day    MEDICATIONS  (PRN):        PHYSICAL EXAM:  T(C): 36.8 (01-30-21 @ 09:45), Max: 36.9 (01-29-21 @ 20:55)  HR: 82 (01-30-21 @ 09:45) (72 - 93)  BP: 117/80 (01-30-21 @ 09:45) (117/80 - 153/90)  RR: 17 (01-30-21 @ 09:45) (17 - 18)  SpO2: 95% (01-30-21 @ 09:45) (95% - 97%)  Wt(kg): --  I&O's Summary    29 Jan 2021 07:01  -  30 Jan 2021 07:00  --------------------------------------------------------  IN: 840 mL / OUT: 800 mL / NET: 40 mL    30 Jan 2021 07:01  -  30 Jan 2021 10:17  --------------------------------------------------------  IN: 240 mL / OUT: 0 mL / NET: 240 mL          Appearance: In no distress	  HEENT:    PERRL, EOMI	  Cardiovascular:  S1 S2, No JVD  Respiratory: Lungs clear to auscultation	  Gastrointestinal:  Soft, Non-tender, + BS	  Vasculature:  No edema of LE  Psychiatric: Appropriate affect   Neuro: no acute focal deficits                               11.4   8.09  )-----------( 382      ( 30 Jan 2021 05:10 )             35.1     01-30    137  |  103  |  27<H>  ----------------------------<  197<H>  3.9   |  22  |  0.85    Ca    9.6      30 Jan 2021 05:10  Phos  3.4     01-30  Mg     1.8     01-30    TPro  6.3  /  Alb  3.6  /  TBili  0.3  /  DBili  x   /  AST  21  /  ALT  23  /  AlkPhos  138<H>  01-30        Labs personally reviewed      ASSESSMENT/PLAN: 	        RICHARD Hobson DO [facility]  Cardiovascular Medicine  [address] [place] [address], [address]  Office: [phone]  Cell: [phone] Patient is a 89y old  Female who presents with a chief complaint of Rt flank pain x 5 days (29 Jan 2021 15:40)      INTERVAL HISTORY: feels ok     REVIEW OF SYSTEMS:   CONSTITUTIONAL: No fever  EYES: No eye pain, visual disturbances, or discharge  ENT:  No difficulty hearing, tinnitus  NECK: No pain or stiffness  RESPIRATORY: No cough, wheezing,  CARDIOVASCULAR: No chest pain, palpitations, passing out, dizziness, or leg swelling  GASTROINTESTINAL:  No nausea, vomiting, diarrhea or constipation. No melena.  GENITOURINARY: No dysuria, hematuria  NEUROLOGICAL: No stroke like symptoms  SKIN: No burning or lesions   ENDOCRINE: No heat or cold intolerance  MUSCULOSKELETAL: No joint pain or swelling  PSYCHIATRIC: No  anxiety, mood swings  HEME/LYMPH: No bleeding gums  ALLERGY AND IMMUNOLOGIC: No hives or eczema	    All other ROS negative    TELEMETRY Personally reviewed: SR first degree AV block , 70s, PVCs   	  MEDICATIONS  (STANDING):  dextrose 40% Gel 15 Gram(s) Oral once  dextrose 5%. 1000 milliLiter(s) (50 mL/Hr) IV Continuous <Continuous>  dextrose 5%. 1000 milliLiter(s) (100 mL/Hr) IV Continuous <Continuous>  dextrose 50% Injectable 25 Gram(s) IV Push once  dextrose 50% Injectable 12.5 Gram(s) IV Push once  dextrose 50% Injectable 25 Gram(s) IV Push once  glucagon  Injectable 1 milliGRAM(s) IntraMuscular once  insulin lispro (ADMELOG) corrective regimen sliding scale   SubCutaneous three times a day before meals  insulin lispro (ADMELOG) corrective regimen sliding scale   SubCutaneous at bedtime  metoprolol tartrate 12.5 milliGRAM(s) Oral two times a day    MEDICATIONS  (PRN):        PHYSICAL EXAM:  T(C): 36.8 (01-30-21 @ 09:45), Max: 36.9 (01-29-21 @ 20:55)  HR: 82 (01-30-21 @ 09:45) (72 - 93)  BP: 117/80 (01-30-21 @ 09:45) (117/80 - 153/90)  RR: 17 (01-30-21 @ 09:45) (17 - 18)  SpO2: 95% (01-30-21 @ 09:45) (95% - 97%)  Wt(kg): --  I&O's Summary    29 Jan 2021 07:01 - 30 Jan 2021 07:00  --------------------------------------------------------  IN: 840 mL / OUT: 800 mL / NET: 40 mL    30 Jan 2021 07:01  -  30 Jan 2021 10:17  --------------------------------------------------------  IN: 240 mL / OUT: 0 mL / NET: 240 mL          Appearance: In no distress	  HEENT:    PERRL, EOMI	  Cardiovascular:  S1 S2, No JVD  Respiratory: Lungs clear to auscultation	  Gastrointestinal:  Soft, Non-tender, + BS	  Vasculature:  No edema of LE  Psychiatric: Appropriate affect   Neuro: no acute focal deficits                               11.4   8.09  )-----------( 382      ( 30 Jan 2021 05:10 )             35.1     01-30    137  |  103  |  27<H>  ----------------------------<  197<H>  3.9   |  22  |  0.85    Ca    9.6      30 Jan 2021 05:10  Phos  3.4     01-30  Mg     1.8     01-30    TPro  6.3  /  Alb  3.6  /  TBili  0.3  /  DBili  x   /  AST  21  /  ALT  23  /  AlkPhos  138<H>  01-30        Labs personally reviewed      ASSESSMENT/PLAN: 	   89 yo female with pmhx of HTN, HLD, NIDDM who presents to ED with complaint of R flank/R lumbar triangle pain       Problem/Plan - 1:  ·  Problem: SVT (supraventricular tachycardia).  Plan: Converted to sinus s/p iv hydration.   -c/w bb   -initial EKG not found but ER documents AF  - TTE Ef 65-70% no WMAx  - continue tele monitoring for now  - If EP agreeable will d/c Eliquis     Problem/Plan - 2:  ·  Problem: Benign essential HTN.  Plan: Hold Enalapril and Amlodipine for now.   Monitor blood pressure off meds.      Problem/Plan - 3:  ·  Problem: Type 2 diabetes mellitus without complication, without long-term current use of insulin.  Plan: HISS follow up A1C.      Problem/Plan - 4:  ·  Problem: Pyelonephritis.  Plan: Acute pyelonephritis- Continue with Ceftriaxone, follow up urine and blood cultures.   CT abdomen shows moderate hydronephrosis-   - per urology c/w iv abx for pyleneophritis and continue to monitor             JASMINA Hobson-EUSEBIO Mcpherson DO [facility]  Cardiovascular Medicine  [address] [place] [address], [address]  Office: [phone]  Cell: [phone] Patient is a 89y old  Female who presents with a chief complaint of Rt flank pain x 5 days (29 Jan 2021 15:40)      INTERVAL HISTORY: feels ok     REVIEW OF SYSTEMS:   CONSTITUTIONAL: No fever  EYES: No eye pain, visual disturbances, or discharge  ENT:  No difficulty hearing, tinnitus  NECK: No pain or stiffness  RESPIRATORY: No cough, wheezing,  CARDIOVASCULAR: No chest pain, palpitations, passing out, dizziness, or leg swelling  GASTROINTESTINAL:  No nausea, vomiting, diarrhea or constipation. No melena.  GENITOURINARY: No dysuria, hematuria  NEUROLOGICAL: No stroke like symptoms  SKIN: No burning or lesions   ENDOCRINE: No heat or cold intolerance  MUSCULOSKELETAL: No joint pain or swelling  PSYCHIATRIC: No  anxiety, mood swings  HEME/LYMPH: No bleeding gums  ALLERGY AND IMMUNOLOGIC: No hives or eczema	    All other ROS negative    TELEMETRY Personally reviewed: SR first degree AV block , 70s, PVCs   	  MEDICATIONS  (STANDING):  dextrose 40% Gel 15 Gram(s) Oral once  dextrose 5%. 1000 milliLiter(s) (50 mL/Hr) IV Continuous <Continuous>  dextrose 5%. 1000 milliLiter(s) (100 mL/Hr) IV Continuous <Continuous>  dextrose 50% Injectable 25 Gram(s) IV Push once  dextrose 50% Injectable 12.5 Gram(s) IV Push once  dextrose 50% Injectable 25 Gram(s) IV Push once  glucagon  Injectable 1 milliGRAM(s) IntraMuscular once  insulin lispro (ADMELOG) corrective regimen sliding scale   SubCutaneous three times a day before meals  insulin lispro (ADMELOG) corrective regimen sliding scale   SubCutaneous at bedtime  metoprolol tartrate 12.5 milliGRAM(s) Oral two times a day    MEDICATIONS  (PRN):        PHYSICAL EXAM:  T(C): 36.8 (01-30-21 @ 09:45), Max: 36.9 (01-29-21 @ 20:55)  HR: 82 (01-30-21 @ 09:45) (72 - 93)  BP: 117/80 (01-30-21 @ 09:45) (117/80 - 153/90)  RR: 17 (01-30-21 @ 09:45) (17 - 18)  SpO2: 95% (01-30-21 @ 09:45) (95% - 97%)  Wt(kg): --  I&O's Summary    29 Jan 2021 07:01 - 30 Jan 2021 07:00  --------------------------------------------------------  IN: 840 mL / OUT: 800 mL / NET: 40 mL    30 Jan 2021 07:01  -  30 Jan 2021 10:17  --------------------------------------------------------  IN: 240 mL / OUT: 0 mL / NET: 240 mL          Appearance: In no distress	  HEENT:    PERRL, EOMI	  Cardiovascular:  S1 S2, No JVD  Respiratory: Lungs clear to auscultation	  Gastrointestinal:  Soft, Non-tender, + BS	  Vasculature:  No edema of LE  Psychiatric: Appropriate affect   Neuro: no acute focal deficits                               11.4   8.09  )-----------( 382      ( 30 Jan 2021 05:10 )             35.1     01-30    137  |  103  |  27<H>  ----------------------------<  197<H>  3.9   |  22  |  0.85    Ca    9.6      30 Jan 2021 05:10  Phos  3.4     01-30  Mg     1.8     01-30    TPro  6.3  /  Alb  3.6  /  TBili  0.3  /  DBili  x   /  AST  21  /  ALT  23  /  AlkPhos  138<H>  01-30        Labs personally reviewed      ASSESSMENT/PLAN: 	   89 yo female with pmhx of HTN, HLD, NIDDM who presents to ED with complaint of R flank/R lumbar triangle pain       Problem/Plan - 1:  ·  Problem: SVT (supraventricular tachycardia).  Plan: Converted to sinus s/p iv hydration.   -c/w bb   -initial EKG not found but ER documents AF  - TTE Ef 65-70% no WMAx  - continue tele monitoring for now  - d/c eliquis per EP - med manage/ outpatient f/u w/ EP      Problem/Plan - 2:  ·  Problem: Benign essential HTN.  Plan: Hold Enalapril and Amlodipine for now.   Monitor blood pressure off meds.      Problem/Plan - 3:  ·  Problem: Type 2 diabetes mellitus without complication, without long-term current use of insulin.  Plan: HISS follow up A1C.      Problem/Plan - 4:  ·  Problem: Pyelonephritis.  Plan: Acute pyelonephritis- Continue with Ceftriaxone, follow up urine and blood cultures.   CT abdomen shows moderate hydronephrosis-   - per urology c/w iv abx for pyleneophritis and continue to monitor             RICHARD Hobson DO [facility]  Cardiovascular Medicine  [address] [place] [address], [address]  Office: [phone]  Cell: [phone]

## 2021-01-30 NOTE — PROGRESS NOTE ADULT - PROVIDER SPECIALTY LIST ADULT
Cardiology
Electrophysiology
Hospitalist
Hospitalist

## 2021-01-30 NOTE — DISCHARGE NOTE PROVIDER - NSFOLLOWUPCLINICS_GEN_ALL_ED_FT
[facility]  Cardiology  [address] [place] [id number]  Phone: [phone]  Fax:   Follow Up Time:

## 2021-01-30 NOTE — PROGRESS NOTE ADULT - SUBJECTIVE AND OBJECTIVE BOX
EP ATTENDING    tele: NSR, first degree AV delay, no further AVNRT, no afib (ever)    she denies palpitations, syncope, nor angina, ROS otherwise -          Review of Systems:   Constitutional: [ ] fevers, [ ] chills.   Skin: [ ] dry skin. [ ] rashes.  Psychiatric: [ ] depression, [ ] anxiety.   Gastrointestinal: [ ] BRBPR, [ ] melena.   Neurological: [ ] confusion. [ ] seizures. [ ] shuffling gait.   Ears,Nose,Mouth and Throat: [ ] ear pain [ ] sore throat.   Eyes: [ ] diplopia.   Respiratory: [ ] hemoptysis. [ ] shortness of breath  Cardiovascular: See HPI above  Hematologic/Lymphatic: [ ] anemia. [ ] painful nodes. [ ] prolonged bleeding.   Genitourinary: [ ] hematuria. [ ] flank pain.   Endocrine: [ ] significant change in weight. [ ] intolerance to heat and cold.     Review of systems [ x] otherwise negative, [ ] otherwise unable to obtain    FH: no family history of sudden cardiac death in first degree relatives    SH: [ ] tobacco, [ ] alcohol, [ ] drugs    dextrose 40% Gel 15 Gram(s) Oral once  dextrose 5%. 1000 milliLiter(s) IV Continuous <Continuous>  dextrose 5%. 1000 milliLiter(s) IV Continuous <Continuous>  dextrose 50% Injectable 25 Gram(s) IV Push once  dextrose 50% Injectable 12.5 Gram(s) IV Push once  dextrose 50% Injectable 25 Gram(s) IV Push once  glucagon  Injectable 1 milliGRAM(s) IntraMuscular once  insulin lispro (ADMELOG) corrective regimen sliding scale   SubCutaneous three times a day before meals  insulin lispro (ADMELOG) corrective regimen sliding scale   SubCutaneous at bedtime  metoprolol tartrate 12.5 milliGRAM(s) Oral two times a day                            11.4   8.09  )-----------( 382      ( 30 Jan 2021 05:10 )             35.1       01-30    137  |  103  |  27<H>  ----------------------------<  197<H>  3.9   |  22  |  0.85    Ca    9.6      30 Jan 2021 05:10  Phos  3.4     01-30  Mg     1.8     01-30    TPro  6.3  /  Alb  3.6  /  TBili  0.3  /  DBili  x   /  AST  21  /  ALT  23  /  AlkPhos  138<H>  01-30            T(C): 36.8 (01-30-21 @ 09:45), Max: 36.9 (01-29-21 @ 20:55)  HR: 82 (01-30-21 @ 09:45) (72 - 93)  BP: 117/80 (01-30-21 @ 09:45) (117/80 - 153/90)  RR: 17 (01-30-21 @ 09:45) (17 - 18)  SpO2: 95% (01-30-21 @ 09:45) (95% - 97%)  Wt(kg): --    I&O's Summary    29 Jan 2021 07:01  -  30 Jan 2021 07:00  --------------------------------------------------------  IN: 840 mL / OUT: 800 mL / NET: 40 mL    30 Jan 2021 07:01  -  30 Jan 2021 13:10  --------------------------------------------------------  IN: 240 mL / OUT: 0 mL / NET: 240 mL        General: Well nourished in no acute distress. Alert and Oriented * 3.   Head: Normocephalic and atraumatic.   Neck: No JVD. No bruits. Supple. Does not appear to be enlarged.   Cardiovascular: + S1,S2 ; RRR Soft systolic murmur at the left lower sternal border. No rubs noted.    Lungs: CTA b/l. No rhonchi, rales or wheezes.   Abdomen: + BS, soft. Non tender. Non distended. No rebound. No guarding.   Extremities: No clubbing/cyanosis/edema.   Neurologic: Moves all four extremities. Full range of motion.   Skin: Warm and moist. The patient's skin has normal elasticity and good skin turgor.   Psychiatric: Appropriate mood and affect.  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 A CT abdomen-pelvis shows a right hydro. EP is now called because while in the ER she developed a rapid narrow complex tachycardia that is very suspicious for typical AVNRT. There are no discernable p-waves, but the rhythm was too regular to suggest Afib. The 12-lead EKG is now available, and is diagnostic for typical AVNRT. There is no AF. Echo normal. She is currently in NSR.    -no anticoagulation as there has been no evidence of Afib nor AFL  -agree with escalating doses of metoprolol  -d/c planning as per primary team  -no further inpatient EP workup needed  -if she fails medical therapy she's a good candidate for a outpatient AVNRT ablation      Yvonne Garcia M.D., [facility]  Cardiac Electrophysiology  [facility]  [address] [place] [address], [place] [address]  [email or web address]    office [phone]  pager [phone]

## 2021-01-30 NOTE — DISCHARGE NOTE NURSING/CASE MANAGEMENT/SOCIAL WORK - PATIENT PORTAL LINK FT
You can access the FollowMyHealth Patient Portal offered by [facility] by registering at the following website: [email or web address]. By joining [facility]’s FollowMyHealth portal, you will also be able to view your health information using other applications (apps) compatible with our system.

## 2021-01-30 NOTE — DISCHARGE NOTE PROVIDER - NSDCCPCAREPLAN_GEN_ALL_CORE_FT
PRINCIPAL DISCHARGE DIAGNOSIS  Diagnosis: Pyelonephritis  Assessment and Plan of Treatment: Resolved with completed course of antibiotics.  Pyelonephritis is a kidney infection. In most cases, the infection clears up with treatment and does not cause further problems. More severe infections or chronic infections can sometimes spread to the bloodstream or lead to other problems with the kidneys. Symptoms include frequent or painful urination, abdominal pain, back pain, flank pain, fever/chills, nausea, or vomiting. If you were prescribed an antibiotic medicine, take it as told by your health care provider. Do not stop taking the antibiotic even if you start to feel better.  SEEK IMMEDIATE MEDICAL CARE IF YOU HAVE ANY OF THE FOLLOWING SYMPTOMS: inability to hold down antibiotics or fluids, worsening pain, dizziness/lightheadedness, or change in mental status.      SECONDARY DISCHARGE DIAGNOSES  Diagnosis: SVT (supraventricular tachycardia)  Assessment and Plan of Treatment: Supraventricular tachycardia (SVT) is a type of abnormal heart rhythm that causes your heart to beat very quickly. A normal heart rate is 60?100 beats per minute. During an episode of SVT, your heart rate may be 150?250 beats per minute. This can make you feel light-headed and short of breath. Although SVT is usually harmless, when SVT happens often or it lasts for long periods, it can lead to heart weakness and failure. SVT can be triggered by stress, smoking, alcohol, caffeine, or stimulant drugs. Treatment may involve certain maneuvers, medicines, or electric shock (cardioversion).   SEEK IMMEDIATE MEDICAL CARE IF YOU HAVE ANY OF THE FOLLOWING SYMPTOMS: chest pain, shortness of breath, dizziness/lightheadedness, or fainting.

## 2021-01-30 NOTE — DISCHARGE NOTE PROVIDER - NSDCMRMEDTOKEN_GEN_ALL_CORE_FT
amLODIPine 5 mg oral tablet: 1 tab(s) orally once a day  atorvastatin 10 mg oral tablet: 1 tab(s) orally 2 times a week (Wednesday/Sunday)  enalapril 10 mg oral tablet: 1 tab(s) orally 2 times a day  Magnesium:   MetFORMIN (Eqv-Glucophage XR) 500 mg oral tablet, extended release: 1 tab(s) orally 3 times a day (with meals)  One-A-Day Multivitamin:   Tylenol: as needed  Vitamin B12:   Vitamin D3:    atorvastatin 10 mg oral tablet: 1 tab(s) orally 2 times a week (Wednesday/Sunday)  cefuroxime 250 mg oral tablet: 1 tab(s) orally 2 times a day   Magnesium:   MetFORMIN (Eqv-Glucophage XR) 500 mg oral tablet, extended release: 1 tab(s) orally 3 times a day (with meals)  metoprolol tartrate 25 mg oral tablet: 0.5 tab(s) orally 2 times a day   One-A-Day Multivitamin:   Tylenol: as needed  Vitamin B12:   Vitamin D3:

## 2021-01-30 NOTE — DISCHARGE NOTE PROVIDER - HOSPITAL COURSE
89 yo female with pmhx of HTN, HLD, NIDDM who presents to ED with complaint of R flank/R lumbar triangle pain     Problem: SVT (supraventricular tachycardia).  Plan: Converted to sinus s/p iv hydration. Monitor on tele.   Cards consulted. - TTE Ef 65-70% no WMA.  Medical management with Beta Blockers. No AC per EP.    Problem: Pyelonephritis.  Plan: Acute pyelonephritis- S/P Ceftriaxone, urine cultures show E.coli. Blood cultures no growth to date.    CT abdomen shows moderate hydronephrosis. Per urology no further intervention necessary.    Problem: Type 2 diabetes mellitus without complication, without long-term current use of insulin.  Plan: HISS follow up A1C - 7.2.     Problem: Benign essential HTN.  Plan: Hold Enalapril and Amlodipine for now.   Monitor blood pressure off meds.

## 2021-01-31 LAB
CULTURE RESULTS: SIGNIFICANT CHANGE UP
CULTURE RESULTS: SIGNIFICANT CHANGE UP
SPECIMEN SOURCE: SIGNIFICANT CHANGE UP
SPECIMEN SOURCE: SIGNIFICANT CHANGE UP

## 2021-06-21 NOTE — ASSESSMENT
Have Your Skin Lesions Been Treated?: not been treated [FreeTextEntry1] : Patient with IBS and rare episodes of fecal incontinence. She is doing well on a regimen of fiber supplements and a probiotic. She takes dicyclomine 10 mg on a p.r.n. basis with relief. Is This A New Presentation, Or A Follow-Up?: Skin Lesions How Severe Is Your Skin Lesion?: mild

## 2022-01-17 NOTE — DISCHARGE NOTE NURSING/CASE MANAGEMENT/SOCIAL WORK - NSTRANSFEREYEGLASSESPAIRS_GEN_A_NUR
DISPLAY PLAN FREE TEXT DISPLAY PLAN FREE TEXT DISPLAY PLAN FREE TEXT DISPLAY PLAN FREE TEXT DISPLAY PLAN FREE TEXT DISPLAY PLAN FREE TEXT DISPLAY PLAN FREE TEXT DISPLAY PLAN FREE TEXT 2 pair DISPLAY PLAN FREE TEXT DISPLAY PLAN FREE TEXT DISPLAY PLAN FREE TEXT DISPLAY PLAN FREE TEXT

## 2024-12-08 NOTE — PHYSICAL THERAPY INITIAL EVALUATION ADULT - WEIGHT-BEARING RESTRICTIONS: SIT/STAND, REHAB EVAL
The patient is Stable - Low risk of patient condition declining or worsening    Shift Goals  Clinical Goals: Monitor Vital signs, pain mangement, sleep and rest comfortbaly  Patient Goals: sleep and rest comfortably, pain control    Progress made toward(s) clinical / shift goals: Patient complained pain of 7 out of 10 both pharmacological and non pharmacological intervention provided. Patient is closely monitored. On reassessment patient is sleeping.     Patient is not progressing towards the following goals:       full weight-bearing

## 2025-06-11 ENCOUNTER — INPATIENT (INPATIENT)
Facility: HOSPITAL | Age: 89
LOS: 1 days | Discharge: HOME HEALTH | DRG: 812 | End: 2025-06-13
Attending: HOSPITALIST | Admitting: STUDENT IN AN ORGANIZED HEALTH CARE EDUCATION/TRAINING PROGRAM
Payer: MEDICARE

## 2025-06-11 VITALS
SYSTOLIC BLOOD PRESSURE: 170 MMHG | OXYGEN SATURATION: 96 % | RESPIRATION RATE: 18 BRPM | HEART RATE: 115 BPM | HEIGHT: 63 IN | WEIGHT: 205.03 LBS | TEMPERATURE: 98 F | DIASTOLIC BLOOD PRESSURE: 78 MMHG

## 2025-06-11 DIAGNOSIS — Z29.9 ENCOUNTER FOR PROPHYLACTIC MEASURES, UNSPECIFIED: ICD-10-CM

## 2025-06-11 DIAGNOSIS — E11.9 TYPE 2 DIABETES MELLITUS WITHOUT COMPLICATIONS: ICD-10-CM

## 2025-06-11 DIAGNOSIS — K92.2 GASTROINTESTINAL HEMORRHAGE, UNSPECIFIED: ICD-10-CM

## 2025-06-11 DIAGNOSIS — I10 ESSENTIAL (PRIMARY) HYPERTENSION: ICD-10-CM

## 2025-06-11 DIAGNOSIS — E78.5 HYPERLIPIDEMIA, UNSPECIFIED: ICD-10-CM

## 2025-06-11 DIAGNOSIS — D64.9 ANEMIA, UNSPECIFIED: ICD-10-CM

## 2025-06-11 LAB
ADD ON TEST-SPECIMEN IN LAB: SIGNIFICANT CHANGE UP
ALBUMIN SERPL ELPH-MCNC: 3.8 G/DL — SIGNIFICANT CHANGE UP (ref 3.3–5)
ALP SERPL-CCNC: 141 U/L — HIGH (ref 40–120)
ALT FLD-CCNC: 19 U/L — SIGNIFICANT CHANGE UP (ref 10–45)
ANION GAP SERPL CALC-SCNC: 16 MMOL/L — SIGNIFICANT CHANGE UP (ref 5–17)
APTT BLD: 26.8 SEC — SIGNIFICANT CHANGE UP (ref 26.1–36.8)
AST SERPL-CCNC: 15 U/L — SIGNIFICANT CHANGE UP (ref 10–40)
BASOPHILS # BLD AUTO: 0.04 K/UL — SIGNIFICANT CHANGE UP (ref 0–0.2)
BASOPHILS NFR BLD AUTO: 0.6 % — SIGNIFICANT CHANGE UP (ref 0–2)
BILIRUB SERPL-MCNC: 0.4 MG/DL — SIGNIFICANT CHANGE UP (ref 0.2–1.2)
BLD GP AB SCN SERPL QL: NEGATIVE — SIGNIFICANT CHANGE UP
BUN SERPL-MCNC: 14 MG/DL — SIGNIFICANT CHANGE UP (ref 7–23)
CALCIUM SERPL-MCNC: 9.6 MG/DL — SIGNIFICANT CHANGE UP (ref 8.4–10.5)
CHLORIDE SERPL-SCNC: 105 MMOL/L — SIGNIFICANT CHANGE UP (ref 96–108)
CO2 SERPL-SCNC: 17 MMOL/L — LOW (ref 22–31)
CREAT SERPL-MCNC: 0.69 MG/DL — SIGNIFICANT CHANGE UP (ref 0.5–1.3)
EGFR: 80 ML/MIN/1.73M2 — SIGNIFICANT CHANGE UP
EGFR: 80 ML/MIN/1.73M2 — SIGNIFICANT CHANGE UP
EOSINOPHIL # BLD AUTO: 0.07 K/UL — SIGNIFICANT CHANGE UP (ref 0–0.5)
EOSINOPHIL NFR BLD AUTO: 1 % — SIGNIFICANT CHANGE UP (ref 0–6)
GLUCOSE SERPL-MCNC: 221 MG/DL — HIGH (ref 70–99)
HCT VFR BLD CALC: 25.1 % — LOW (ref 34.5–45)
HGB BLD-MCNC: 7.7 G/DL — LOW (ref 11.5–15.5)
IMM GRANULOCYTES NFR BLD AUTO: 0.3 % — SIGNIFICANT CHANGE UP (ref 0–0.9)
INR BLD: 1.04 RATIO — SIGNIFICANT CHANGE UP (ref 0.85–1.16)
LYMPHOCYTES # BLD AUTO: 0.9 K/UL — LOW (ref 1–3.3)
LYMPHOCYTES # BLD AUTO: 12.6 % — LOW (ref 13–44)
MCHC RBC-ENTMCNC: 27.1 PG — SIGNIFICANT CHANGE UP (ref 27–34)
MCHC RBC-ENTMCNC: 30.7 G/DL — LOW (ref 32–36)
MCV RBC AUTO: 88.4 FL — SIGNIFICANT CHANGE UP (ref 80–100)
MONOCYTES # BLD AUTO: 0.55 K/UL — SIGNIFICANT CHANGE UP (ref 0–0.9)
MONOCYTES NFR BLD AUTO: 7.7 % — SIGNIFICANT CHANGE UP (ref 2–14)
NEUTROPHILS # BLD AUTO: 5.55 K/UL — SIGNIFICANT CHANGE UP (ref 1.8–7.4)
NEUTROPHILS NFR BLD AUTO: 77.8 % — HIGH (ref 43–77)
NRBC BLD AUTO-RTO: 0 /100 WBCS — SIGNIFICANT CHANGE UP (ref 0–0)
OB PNL STL: POSITIVE
PLATELET # BLD AUTO: 357 K/UL — SIGNIFICANT CHANGE UP (ref 150–400)
POTASSIUM SERPL-MCNC: 4.3 MMOL/L — SIGNIFICANT CHANGE UP (ref 3.5–5.3)
POTASSIUM SERPL-SCNC: 4.3 MMOL/L — SIGNIFICANT CHANGE UP (ref 3.5–5.3)
PROT SERPL-MCNC: 6.7 G/DL — SIGNIFICANT CHANGE UP (ref 6–8.3)
PROTHROM AB SERPL-ACNC: 12 SEC — SIGNIFICANT CHANGE UP (ref 9.9–13.4)
RBC # BLD: 2.84 M/UL — LOW (ref 3.8–5.2)
RBC # FLD: 13.7 % — SIGNIFICANT CHANGE UP (ref 10.3–14.5)
RH IG SCN BLD-IMP: POSITIVE — SIGNIFICANT CHANGE UP
SODIUM SERPL-SCNC: 138 MMOL/L — SIGNIFICANT CHANGE UP (ref 135–145)
TROPONIN T, HIGH SENSITIVITY RESULT: 18 NG/L — SIGNIFICANT CHANGE UP (ref 0–51)
WBC # BLD: 7.13 K/UL — SIGNIFICANT CHANGE UP (ref 3.8–10.5)
WBC # FLD AUTO: 7.13 K/UL — SIGNIFICANT CHANGE UP (ref 3.8–10.5)

## 2025-06-11 PROCEDURE — 99285 EMERGENCY DEPT VISIT HI MDM: CPT

## 2025-06-11 PROCEDURE — 71045 X-RAY EXAM CHEST 1 VIEW: CPT | Mod: 26

## 2025-06-11 PROCEDURE — 93010 ELECTROCARDIOGRAM REPORT: CPT

## 2025-06-11 PROCEDURE — 99223 1ST HOSP IP/OBS HIGH 75: CPT

## 2025-06-11 PROCEDURE — 99418 PROLNG IP/OBS E/M EA 15 MIN: CPT

## 2025-06-11 RX ORDER — FUROSEMIDE 10 MG/ML
40 INJECTION INTRAMUSCULAR; INTRAVENOUS ONCE
Refills: 0 | Status: COMPLETED | OUTPATIENT
Start: 2025-06-11 | End: 2025-06-11

## 2025-06-11 RX ADMIN — FUROSEMIDE 40 MILLIGRAM(S): 10 INJECTION INTRAMUSCULAR; INTRAVENOUS at 19:16

## 2025-06-11 NOTE — ED PROVIDER NOTE - RAPID ASSESSMENT
89-year-old female with past medical history of hypertension, hyperlipidemia, diabetes presenting with anemia.  Patient reports that she had routine follow-up blood work with her PCP and received a call today saying that she was anemic and needed to come to the hospital for blood transfusion.  Patient denies black/bloody stools.  Patient does endorse some dizziness, but she states this has been chronic for many years.    **Patient was rapidly assessed by me, Daryn Gregory PA-C. A limited history was obtained. The patient will be seen and further examined/worked up in the main ED and their care will be completed by the main ED team. Receiving team will follow up on labs, analgesia, any clinical imaging, and perform reassessment and disposition of the patient as clinically indicated. All decisions regarding the progression of care will be made at their discretion.

## 2025-06-11 NOTE — ED ADULT NURSE REASSESSMENT NOTE - NS ED NURSE REASSESS COMMENT FT1
Pt placed on premafit with skin clean, dry, and intact. Blood transfusion initiated. Pt verbalizes understanding of blood transfusion reactions. Pt on CM. Safety and comfort measures maintained.

## 2025-06-11 NOTE — H&P ADULT - PROBLEM SELECTOR PLAN 1
-was supposed to take lasix but had stopped.   - + guiac.   last c-scope?  -1u prbc transfusion in the ED. f/u rpt cbc. + kyle.   -reports last colonoscopy was >10yrs ago, had removed polyps before, but she was told that colonoscopy for her was no longer recommended (it's unclear if it's due to age or due difficult exam as pt has h/o radiation to the area from uterine ca).   -1u prbc transfusion in the ED. f/u rpt cbc. + guiac.   -reports last colonoscopy was >10yrs ago, had removed polyps before, but she was told that colonoscopy for her was no longer recommended (it's unclear if it's due to age or due difficult exam as pt has h/o radiation to the area from uterine ca).   -1u prbc transfusion in the ED. f/u rpt cbc.  -had lengthy discussion with patient; she would prefer not to have colonoscopy, and prefer virtual colonoscopy. I discussed with patient that her LGIB may be due to cancer, especially given her h/o previous colon polyp. She'd still prefer not to get colonoscopy and follow up with her gastroenterologist outpatient for virtual colonoscopy. She reports both of her sons are retired and is very helpful in making and taking her to appts.   -will defer GI c/s for now. If rpt hgb appropriate and morning hgb stable, can dc today with outpatient follow up.     -holding asa 81mg for GIB. Pt could not tell me indication for asa 81mg, denies h/o ischemic heart disease.  -pt is not on AC. Pt reports h/o "irregular heart rate" but she had holter monitor on for 2wks with no episode and she was supposed to go back to cardiology for further evaluation. On exam today, heart exam with regular rhythm and rate. will order EKG, follow up. + guiac.   -reports last colonoscopy was >10yrs ago, had removed polyps before, but she was told that colonoscopy for her was no longer recommended (it's unclear if it's due to age or due difficult exam as pt has h/o radiation to the area from uterine ca).   -1u prbc transfusion in the ED. f/u rpt cbc.  -ordered for protonix iv 40   -had lengthy discussion with patient; she would prefer not to have colonoscopy, and prefer virtual colonoscopy. I discussed with patient that her LGIB may be due to cancer, especially given her h/o previous colon polyp. She'd still prefer not to get colonoscopy and follow up with her gastroenterologist outpatient for virtual colonoscopy. She reports both of her sons are retired and is very helpful in making and taking her to appts.   -will defer GI c/s for now. If rpt hgb appropriate and morning hgb stable, can dc today with outpatient follow up.     -holding asa 81mg for GIB. Pt could not tell me indication for asa 81mg, denies h/o ischemic heart disease.  -pt is not on AC. Pt reports h/o "irregular heart rate" but she had holter monitor on for 2wks with no episode and she was supposed to go back to cardiology for further evaluation. On exam today, heart exam with regular rhythm and rate. will order EKG, follow up. + guiac.   -reports last colonoscopy was >10yrs ago, had removed polyps before, but she was told that colonoscopy for her was no longer recommended (it's unclear if it's due to age or due difficult exam as pt has h/o radiation to the area from uterine ca).   -1u prbc transfusion in the ED. f/u rpt cbc.  -ordered for protonix iv 40   -had lengthy discussion with patient; she would prefer not to have colonoscopy, and prefer virtual colonoscopy. I discussed with patient that her GIB may be due to cancer, especially given her h/o previous colon polyp. She'd still prefer not to get colonoscopy and follow up with her gastroenterologist outpatient for virtual colonoscopy. She reports both of her sons are retired and is very helpful in making and taking her to appts.   -will defer GI c/s for now. If rpt hgb appropriate and morning hgb stable, can dc today with outpatient follow up.     -holding asa 81mg for GIB. Pt could not tell me indication for asa 81mg, denies h/o ischemic heart disease.  -pt is not on AC. Pt reports h/o "irregular heart rate" but she had holter monitor on for 2wks with no episode and she was supposed to go back to cardiology for further evaluation. On exam today, heart exam with regular rhythm and rate. will order EKG, follow up.

## 2025-06-11 NOTE — H&P ADULT - HISTORY OF PRESENT ILLNESS
89F PMHx HTN/HLD, DM. Pt went to PMD recently, had blood work, and was called today saying she was anemic and referred to ED for further eval. Pt endorsed some chronic dizziness, not worsened recently. Pt denied f/c, cp, sob, abd pain, n/v/d, hematochezia, melena, hematemesis or hemoptysis.     In the ED, afebrile, tachy to 115, hypertensive to 197/95, on RA.   CBC w/ wbc 7.13, hgb 7.7, plt 357.   Coag wnl.   CMP w/ Na 138, K 4.3, SCr 0.69.   Trop 17, 16. ProBNP 116.   CXR clear.   GUIAC +    Pt received 1u prbc and 40 ivp lasix for physical exam consistent w/ fluid overload.  89F PMHx HTN/HLD, DM, uterine ca s/p hysterectomy and RT. Pt went to PMD recently, had blood work, and was called today saying she was anemic and referred to ED for further eval. Pt endorsed some chronic dizziness, not worsened recently. Pt denied f/c, cp, sob, abd pain, n/v/d, hematochezia, melena, hematemesis or hemoptysis.     In the ED, afebrile, tachy to 115, hypertensive to 197/95, on RA.   CBC w/ wbc 7.13, hgb 7.7, plt 357.   Coag wnl.   CMP w/ Na 138, K 4.3, SCr 0.69.   Trop 17, 16. ProBNP 116.   CXR clear.   GUIAC +    Pt received 1u prbc and 40 ivp lasix for physical exam consistent w/ fluid overload.

## 2025-06-11 NOTE — ED PROVIDER NOTE - NS ED MD DISPO ADMITTING SERVICE
Has h/o PAF, s/p TAVR.  Had a GI bleed and gastritis. She is under the care of cardiology and EP. Will defer to Dr. Comer. MAT   MED

## 2025-06-11 NOTE — H&P ADULT - ASSESSMENT
89F admitted for anemia, + GUIAC.       #BLE edema  -no crackles on exam. cxr clear. probnp not elevated.   -reportedly had echo 2wks ago, was told "normal". pt declines rpt tte here in the hospital. pt to f/u with outpatient cardiology after discharge for BLE edema.    89F admitted for anemia, + GUIAC.    89F admitted for anemia, + GUIAC, s/p CBC.

## 2025-06-11 NOTE — ED ADULT NURSE NOTE - OBJECTIVE STATEMENT
89Y female, A&Ox4, PMH of hypertension, hyperlipidemia, diabetes. pt presenting to the ED c/o anemia.  Patient reports that she had routine follow-up blood work with her PCP and received a call today saying that she was anemic and needed to come to the hospital for blood transfusion.  Patient denies black/bloody stools.  Patient does endorse some dizziness, but she states this has been chronic for many years.

## 2025-06-11 NOTE — H&P ADULT - TIME BILLING
Time-based billing (NON-critical care).   The necessity of the time spent during the encounter on this date of service was due to:     - Ordering, reviewing, and interpreting labs, testing, and imaging.  - Independently obtaining a review of systems and performing a physical exam  - Reviewing prior hospitalization and where necessary, outpatient records.  - Counselling and educating patient and/or caregiver regarding interpretation of aforementioned items and plan of care.

## 2025-06-11 NOTE — ED ADULT TRIAGE NOTE - CHIEF COMPLAINT QUOTE
Pt reports low hemoglobin outpatient labs, sent in for blood transfusion.   Pt reports pallor, dizziness, weakness.

## 2025-06-11 NOTE — H&P ADULT - NSHPLABSRESULTS_GEN_ALL_CORE
7.7    7.13  )-----------( 357      ( 11 Jun 2025 17:22 )             25.1       06-11    138  |  105  |  14  ----------------------------<  221[H]  4.3   |  17[L]  |  0.69    Ca    9.6      11 Jun 2025 17:22    TPro  6.7  /  Alb  3.8  /  TBili  0.4  /  DBili  x   /  AST  15  /  ALT  19  /  AlkPhos  141[H]  06-11              Urinalysis Basic - ( 11 Jun 2025 17:22 )    Color: x / Appearance: x / SG: x / pH: x  Gluc: 221 mg/dL / Ketone: x  / Bili: x / Urobili: x   Blood: x / Protein: x / Nitrite: x   Leuk Esterase: x / RBC: x / WBC x   Sq Epi: x / Non Sq Epi: x / Bacteria: x        PT/INR - ( 11 Jun 2025 17:22 )   PT: 12.0 sec;   INR: 1.04 ratio         PTT - ( 11 Jun 2025 17:22 )  PTT:26.8 sec          CAPILLARY BLOOD GLUCOSE
no

## 2025-06-11 NOTE — H&P ADULT - PROBLEM SELECTOR PLAN 6
VTE ppx: scd. holding chemical ppx d/t LGIB  GI ppx: n/i     Med rec: done with patient by bedside.   Diet: dm diet VTE ppx: scd. holding chemical ppx d/t GIB  GI ppx: ppi iv    Med rec: done with patient by bedside.   Diet: dm diet    F/u PT for dispo planning

## 2025-06-11 NOTE — H&P ADULT - PROBLEM SELECTOR PLAN 3
-no crackles on lung exam. cxr clear. probnp not elevated.   -reportedly had echo 2wks ago, was told "normal". pt declines rpt tte here in the hospital. pt to f/u with outpatient cardiology after discharge for BLE edema and lasix dosing as pt was told to take it every other day but has stopped it herself 1mo ago due to feeling dizzy when she takes it.

## 2025-06-11 NOTE — ED PROVIDER NOTE - ATTENDING APP SHARED VISIT CONTRIBUTION OF CARE
I have personally performed a face to face medical and diagnostic evaluation of the patient. I have discussed with and reviewed the Resident's and/or ACP's and/or Medical/PA/NP student's note and agree with the History, ROS, Physical Exam and MDM unless otherwise indicated. A brief summary of my personal evaluation and impression can be found below.      89 female past medical history hypertension, hyperlipidemia, diabetes no blood thinners, supposed to be on Lasix but has not been taking presents to the emergency department with progressively worsening anemia, sent in by PCP due to low hemoglobin, patient endorses 1 month of worsening lightheadedness on exertion and dyspnea on exertion as well as worsening pallor throughout.  Patient denies bloody stools or dark stools.  He supposed to be worked up outpatient for new anemia but due to  acutely decreasing hemoglobin on outpatient labs sent to ED for further evaluation.  Denies chest pain.  Patient with worsening lower extremity edema over the last month.      Physical exam shows pallor throughout, mucosal pallor.  Pitting edema 1+ bilaterally up to the knee, distal pulses 2+ bilaterally.  Rales at bases, no tachypnea or hypoxia.  Regular rate and rhythm.  Abdomen soft nontender.    Given hx and physical, ddx includes but is not limited to   Anemia, possible slow GI bleed, gastritis, PUD, metabolic derangement, CHF.  plan for EKG, x-ray, labs, rectal, likely admission for GI and echo.

## 2025-06-11 NOTE — ED PROVIDER NOTE - PROGRESS NOTE DETAILS
Macey Mayes DO (Attending):   Patient hemoglobin 7.7 here however acutely decreasing as per patient on outpatient labs without clear source.  Patient mildly tachycardic and symptomatic, will transfuse.  Patient also with mild fluid overload on exam, supposed to be on Lasix but stopped taking.  Will obtain labs and x-ray but patient will likely need Lasix with blood transfusion. Macey Mayes DO (Attending): Rectal exam: performed by CHITRA Cavazos. No hemmorrhoids or fissures noted on external exam. Brownish/green stool present. Shayne Velasco DO (PGY1):   Received signout at 1900 and have been caring for this patient since that time.  In brief, patient is a 89F, PMHx HTN, HLD, diabetes presenting with anemia.  Patient reportedly undergoing outpatient anemia workup with GI, however given persistent and worsening anemia, with symptomatology, patient sent in by GI for further evaluation.  Patient noted to be anemic to 7.7, mildly fluid overloaded.  Lasix given in addition to bolus.  Guaiac positive.  Patient endorsed to medicine for admission.

## 2025-06-11 NOTE — PHYSICAL THERAPY INITIAL EVALUATION ADULT - PHYSICAL ASSIST/NONPHYSICAL ASSIST, REHAB EVAL
Will, Gautam Bains, Conor Cunningham, Vijaya Bazan, Scooby verbal cues Will, Gautam Bains, Conor Cunningham, Vijaya Bazan, Scooby Kang, Lois

## 2025-06-12 DIAGNOSIS — R60.0 LOCALIZED EDEMA: ICD-10-CM

## 2025-06-12 DIAGNOSIS — K92.2 GASTROINTESTINAL HEMORRHAGE, UNSPECIFIED: ICD-10-CM

## 2025-06-12 PROBLEM — I10 ESSENTIAL (PRIMARY) HYPERTENSION: Chronic | Status: ACTIVE | Noted: 2021-01-26

## 2025-06-12 PROBLEM — E78.5 HYPERLIPIDEMIA, UNSPECIFIED: Chronic | Status: ACTIVE | Noted: 2021-01-26

## 2025-06-12 PROBLEM — E11.9 TYPE 2 DIABETES MELLITUS WITHOUT COMPLICATIONS: Chronic | Status: ACTIVE | Noted: 2021-01-26

## 2025-06-12 LAB
ANION GAP SERPL CALC-SCNC: 15 MMOL/L — SIGNIFICANT CHANGE UP (ref 5–17)
BUN SERPL-MCNC: 13 MG/DL — SIGNIFICANT CHANGE UP (ref 7–23)
CALCIUM SERPL-MCNC: 9.5 MG/DL — SIGNIFICANT CHANGE UP (ref 8.4–10.5)
CHLORIDE SERPL-SCNC: 104 MMOL/L — SIGNIFICANT CHANGE UP (ref 96–108)
CO2 SERPL-SCNC: 21 MMOL/L — LOW (ref 22–31)
CREAT SERPL-MCNC: 0.71 MG/DL — SIGNIFICANT CHANGE UP (ref 0.5–1.3)
EGFR: 79 ML/MIN/1.73M2 — SIGNIFICANT CHANGE UP
EGFR: 79 ML/MIN/1.73M2 — SIGNIFICANT CHANGE UP
GLUCOSE BLDC GLUCOMTR-MCNC: 142 MG/DL — HIGH (ref 70–99)
GLUCOSE BLDC GLUCOMTR-MCNC: 174 MG/DL — HIGH (ref 70–99)
GLUCOSE BLDC GLUCOMTR-MCNC: 196 MG/DL — HIGH (ref 70–99)
GLUCOSE BLDC GLUCOMTR-MCNC: 220 MG/DL — HIGH (ref 70–99)
GLUCOSE SERPL-MCNC: 181 MG/DL — HIGH (ref 70–99)
HCT VFR BLD CALC: 28.4 % — LOW (ref 34.5–45)
HCT VFR BLD CALC: 31.3 % — LOW (ref 34.5–45)
HGB BLD-MCNC: 8.9 G/DL — LOW (ref 11.5–15.5)
HGB BLD-MCNC: 9.5 G/DL — LOW (ref 11.5–15.5)
MAGNESIUM SERPL-MCNC: 1.7 MG/DL — SIGNIFICANT CHANGE UP (ref 1.6–2.6)
MCHC RBC-ENTMCNC: 26.6 PG — LOW (ref 27–34)
MCHC RBC-ENTMCNC: 27.5 PG — SIGNIFICANT CHANGE UP (ref 27–34)
MCHC RBC-ENTMCNC: 30.4 G/DL — LOW (ref 32–36)
MCHC RBC-ENTMCNC: 31.3 G/DL — LOW (ref 32–36)
MCV RBC AUTO: 87.7 FL — SIGNIFICANT CHANGE UP (ref 80–100)
MCV RBC AUTO: 87.7 FL — SIGNIFICANT CHANGE UP (ref 80–100)
NRBC BLD AUTO-RTO: 0 /100 WBCS — SIGNIFICANT CHANGE UP (ref 0–0)
NRBC BLD AUTO-RTO: 0 /100 WBCS — SIGNIFICANT CHANGE UP (ref 0–0)
PHOSPHATE SERPL-MCNC: 2.7 MG/DL — SIGNIFICANT CHANGE UP (ref 2.5–4.5)
PLATELET # BLD AUTO: 357 K/UL — SIGNIFICANT CHANGE UP (ref 150–400)
PLATELET # BLD AUTO: 383 K/UL — SIGNIFICANT CHANGE UP (ref 150–400)
POTASSIUM SERPL-MCNC: 4 MMOL/L — SIGNIFICANT CHANGE UP (ref 3.5–5.3)
POTASSIUM SERPL-SCNC: 4 MMOL/L — SIGNIFICANT CHANGE UP (ref 3.5–5.3)
RBC # BLD: 3.24 M/UL — LOW (ref 3.8–5.2)
RBC # BLD: 3.57 M/UL — LOW (ref 3.8–5.2)
RBC # FLD: 13.7 % — SIGNIFICANT CHANGE UP (ref 10.3–14.5)
RBC # FLD: 13.8 % — SIGNIFICANT CHANGE UP (ref 10.3–14.5)
SODIUM SERPL-SCNC: 140 MMOL/L — SIGNIFICANT CHANGE UP (ref 135–145)
WBC # BLD: 9.04 K/UL — SIGNIFICANT CHANGE UP (ref 3.8–10.5)
WBC # BLD: 9.53 K/UL — SIGNIFICANT CHANGE UP (ref 3.8–10.5)
WBC # FLD AUTO: 9.04 K/UL — SIGNIFICANT CHANGE UP (ref 3.8–10.5)
WBC # FLD AUTO: 9.53 K/UL — SIGNIFICANT CHANGE UP (ref 3.8–10.5)

## 2025-06-12 PROCEDURE — 99222 1ST HOSP IP/OBS MODERATE 55: CPT

## 2025-06-12 PROCEDURE — 99233 SBSQ HOSP IP/OBS HIGH 50: CPT

## 2025-06-12 PROCEDURE — 74177 CT ABD & PELVIS W/CONTRAST: CPT | Mod: 26

## 2025-06-12 PROCEDURE — 99232 SBSQ HOSP IP/OBS MODERATE 35: CPT

## 2025-06-12 RX ORDER — DILTIAZEM HYDROCHLORIDE 240 MG/1
120 TABLET, EXTENDED RELEASE ORAL
Refills: 0 | Status: DISCONTINUED | OUTPATIENT
Start: 2025-06-12 | End: 2025-06-13

## 2025-06-12 RX ORDER — INSULIN LISPRO 100 U/ML
INJECTION, SOLUTION INTRAVENOUS; SUBCUTANEOUS AT BEDTIME
Refills: 0 | Status: DISCONTINUED | OUTPATIENT
Start: 2025-06-12 | End: 2025-06-13

## 2025-06-12 RX ORDER — ACETAMINOPHEN 500 MG/5ML
650 LIQUID (ML) ORAL EVERY 6 HOURS
Refills: 0 | Status: DISCONTINUED | OUTPATIENT
Start: 2025-06-12 | End: 2025-06-13

## 2025-06-12 RX ORDER — MAGNESIUM, ALUMINUM HYDROXIDE 200-200 MG
30 TABLET,CHEWABLE ORAL EVERY 4 HOURS
Refills: 0 | Status: DISCONTINUED | OUTPATIENT
Start: 2025-06-12 | End: 2025-06-13

## 2025-06-12 RX ORDER — GLUCAGON 3 MG/1
1 POWDER NASAL ONCE
Refills: 0 | Status: DISCONTINUED | OUTPATIENT
Start: 2025-06-12 | End: 2025-06-13

## 2025-06-12 RX ORDER — DEXTROSE 50 % IN WATER 50 %
12.5 SYRINGE (ML) INTRAVENOUS ONCE
Refills: 0 | Status: DISCONTINUED | OUTPATIENT
Start: 2025-06-12 | End: 2025-06-13

## 2025-06-12 RX ORDER — MELATONIN 5 MG
3 TABLET ORAL AT BEDTIME
Refills: 0 | Status: DISCONTINUED | OUTPATIENT
Start: 2025-06-12 | End: 2025-06-12

## 2025-06-12 RX ORDER — SODIUM CHLORIDE 9 G/1000ML
1000 INJECTION, SOLUTION INTRAVENOUS
Refills: 0 | Status: DISCONTINUED | OUTPATIENT
Start: 2025-06-12 | End: 2025-06-13

## 2025-06-12 RX ORDER — DEXTROSE 50 % IN WATER 50 %
25 SYRINGE (ML) INTRAVENOUS ONCE
Refills: 0 | Status: DISCONTINUED | OUTPATIENT
Start: 2025-06-12 | End: 2025-06-13

## 2025-06-12 RX ORDER — ONDANSETRON HCL/PF 4 MG/2 ML
4 VIAL (ML) INJECTION EVERY 8 HOURS
Refills: 0 | Status: DISCONTINUED | OUTPATIENT
Start: 2025-06-12 | End: 2025-06-13

## 2025-06-12 RX ORDER — MELATONIN 5 MG
5 TABLET ORAL AT BEDTIME
Refills: 0 | Status: DISCONTINUED | OUTPATIENT
Start: 2025-06-12 | End: 2025-06-13

## 2025-06-12 RX ORDER — INSULIN LISPRO 100 U/ML
INJECTION, SOLUTION INTRAVENOUS; SUBCUTANEOUS
Refills: 0 | Status: DISCONTINUED | OUTPATIENT
Start: 2025-06-12 | End: 2025-06-13

## 2025-06-12 RX ORDER — DEXTROSE 50 % IN WATER 50 %
15 SYRINGE (ML) INTRAVENOUS ONCE
Refills: 0 | Status: DISCONTINUED | OUTPATIENT
Start: 2025-06-12 | End: 2025-06-13

## 2025-06-12 RX ORDER — DILTIAZEM HYDROCHLORIDE 240 MG/1
1 TABLET, EXTENDED RELEASE ORAL
Refills: 0 | DISCHARGE

## 2025-06-12 RX ADMIN — Medication 5 MILLIGRAM(S): at 23:48

## 2025-06-12 RX ADMIN — INSULIN LISPRO 1: 100 INJECTION, SOLUTION INTRAVENOUS; SUBCUTANEOUS at 08:24

## 2025-06-12 RX ADMIN — DILTIAZEM HYDROCHLORIDE 120 MILLIGRAM(S): 240 TABLET, EXTENDED RELEASE ORAL at 01:01

## 2025-06-12 RX ADMIN — Medication 40 MILLIGRAM(S): at 01:20

## 2025-06-12 RX ADMIN — Medication 40 MILLIGRAM(S): at 17:02

## 2025-06-12 RX ADMIN — DILTIAZEM HYDROCHLORIDE 120 MILLIGRAM(S): 240 TABLET, EXTENDED RELEASE ORAL at 21:18

## 2025-06-12 RX ADMIN — INSULIN LISPRO 2: 100 INJECTION, SOLUTION INTRAVENOUS; SUBCUTANEOUS at 11:02

## 2025-06-12 NOTE — PATIENT PROFILE ADULT - FALL HARM RISK - HARM RISK INTERVENTIONS

## 2025-06-12 NOTE — CONSULT NOTE ADULT - ASSESSMENT
89F PMHx HTN/HLD, DM, uterine ca s/p hysterectomy and XRT. Pt went to PMD recently, had blood work, and was called today saying she was anemic and referred to ED for further eval.  Daily brown stools. NO melena or rectal bleeding  in ED had CHEMO with green-brown stool documented    Primary GI - Dr Panda  Last colonoscopy ~8 years ago; pt states she was told she is too high risk for any future colonoscopies 2/2 scar tissue from previous pelvic XRT    #FOBT+ anemia without overt/brisk GI bleed (green brown stool on ED CHEMO)   given pt's advanced age and history of pelvic XRT pt is high risk for colonoscopy; no indication of overt/brisk GI bleed; likely occult source DDx includes ?AVM ?gastritis/esophagitis ?occult lesion  appropriate Hgb response to 1u PRBC (7.7 ->8.9) and Hgb uptrending  #hx Uterine CA sp hysterectomy and pelvic XRT    RECS:  -PO diet as tolerated  -can dc IV PPI, ok for PO PPI  -pt wishes to have CT to evaluate for possible mass/occult lesion; NO GI objection to this.     No plans for endoluminal evaluation  Can follow up with primary outpt GI after DC (Dr EUSEBIO Panda)    Can recall GI if CT AP w/ concerning GI findings  Will sign off care at this time. Please e-mail [email or web address] if there are any additional questions or concerns, during weekdays from 8 am to 5 pm.   Please call answering service for on-call GI fellow ([phone]) after 5pm and before 8am, and on weekends.    d/w pt; all questions answered  d/w Medicine attending    Cecil Wong PA-C  [facility]  After hours and weekend coverage [phone]

## 2025-06-12 NOTE — CONSULT NOTE ADULT - SUBJECTIVE AND OBJECTIVE BOX
**** INCOMPLETE NOTE ****      HPI:  89F PMHx HTN/HLD, DM, uterine ca s/p hysterectomy and RT. Pt went to PMD recently, had blood work, and was called today saying she was anemic and referred to ED for further eval. Pt endorsed some chronic dizziness, not worsened recently. Pt denied f/c, cp, sob, abd pain, n/v/d, hematochezia, melena, hematemesis or hemoptysis.     In the ED, afebrile, tachy to 115, hypertensive to 197/95, on RA.   CBC w/ wbc 7.13, hgb 7.7, plt 357.   Coag wnl.   CMP w/ Na 138, K 4.3, SCr 0.69.   Trop 17, 16. ProBNP 116.   CXR clear.   GUIAC +    Pt received 1u prbc and 40 ivp lasix for physical exam consistent w/ fluid overload.  (11 Jun 2025 22:45)      PAST MEDICAL & SURGICAL HISTORY:  DM (diabetes mellitus)    Benign essential HTN    HLD (hyperlipidemia)          Allergies  No Known Allergies      MEDICATIONS  (STANDING):  dextrose 5%. 1000 milliLiter(s) (50 mL/Hr) IV Continuous <Continuous>  dextrose 5%. 1000 milliLiter(s) (100 mL/Hr) IV Continuous <Continuous>  dextrose 50% Injectable 25 Gram(s) IV Push once  dextrose 50% Injectable 12.5 Gram(s) IV Push once  dextrose 50% Injectable 25 Gram(s) IV Push once  diltiazem    Tablet 120 milliGRAM(s) Oral <User Schedule>  glucagon  Injectable 1 milliGRAM(s) IntraMuscular once  insulin lispro (ADMELOG) corrective regimen sliding scale   SubCutaneous three times a day before meals  insulin lispro (ADMELOG) corrective regimen sliding scale   SubCutaneous at bedtime  pantoprazole  Injectable 40 milliGRAM(s) IV Push two times a day    MEDICATIONS  (PRN):  acetaminophen     Tablet .. 650 milliGRAM(s) Oral every 6 hours PRN Temp greater or equal to 38C (100.4F), Mild Pain (1 - 3)  aluminum hydroxide/magnesium hydroxide/simethicone Suspension 30 milliLiter(s) Oral every 4 hours PRN Dyspepsia  dextrose Oral Gel 15 Gram(s) Oral once PRN Blood Glucose LESS THAN 70 milliGRAM(s)/deciliter  melatonin 3 milliGRAM(s) Oral at bedtime PRN Insomnia  ondansetron Injectable 4 milliGRAM(s) IV Push every 8 hours PRN Nausea and/or Vomiting      Social History:        Family History   IBD (  ) Yes   (  ) No  GI Malignancy (  )  Yes    (  ) No        Advanced Directives: (  X   ) None    (      ) DNR    (     ) DNI    (     ) Health Care Proxy:     Review of Systems:      Vital Signs Last 24 Hrs  T(C): 36.7 (12 Jun 2025 13:12), Max: 36.9 (11 Jun 2025 17:01)  T(F): 98 (12 Jun 2025 13:12), Max: 98.4 (11 Jun 2025 17:01)  HR: 99 (12 Jun 2025 13:12) (97 - 115)  BP: 154/80 (12 Jun 2025 13:12) (137/92 - 194/95)  BP(mean): --  RR: 18 (12 Jun 2025 13:12) (17 - 19)  SpO2: 96% (12 Jun 2025 13:12) (96% - 99%)    Parameters below as of 12 Jun 2025 13:12  Patient On (Oxygen Delivery Method): room air        PHYSICAL EXAM:    Constitutional:   Neck:   Respiratory:   Cardiovascular:   Gastrointestinal:   Extremities:   Vascular:   Neurological:   Psychiatric:   Skin: No rashes        LABS:                        9.5    9.53  )-----------( 357      ( 12 Jun 2025 07:05 )             31.3     06-12    140  |  104  |  13  ----------------------------<  181[H]  4.0   |  21[L]  |  0.71    Ca    9.5      12 Jun 2025 07:05  Phos  2.7     06-12  Mg     1.7     06-12    TPro  6.7  /  Alb  3.8  /  TBili  0.4  /  DBili  x   /  AST  15  /  ALT  19  /  AlkPhos  141[H]  06-11    PT/INR - ( 11 Jun 2025 17:22 )   PT: 12.0 sec;   INR: 1.04 ratio    PTT - ( 11 Jun 2025 17:22 )  PTT:26.8 sec          RADIOLOGY & ADDITIONAL TESTS:    ACC: [id number] EXAM:  XR CHEST PORTABLE URGENT 1V   ORDERED BY: SERGIO MART     PROCEDURE DATE:  06/11/2025          INTERPRETATION:  EXAMINATION: XR CHEST URGENT    CLINICAL INDICATION: eval for pulm edema    TECHNIQUE: Single frontal, portable view of the chest was obtained.    COMPARISON: Chest x-ray 9/25/2005.    FINDINGS:    The heart is normal in size.  The lungs are clear.  There is no pneumothorax or pleural effusion.  No acute abnormalities in the visualized osseous structures.    IMPRESSION:  Clear lungs     HPI:  89F PMHx HTN/HLD, DM, uterine ca s/p hysterectomy and XRT. Pt went to PMD recently, had blood work, and was called today saying she was anemic and referred to ED for further eval. Pt endorsed some chronic dizziness, not worsened recently. Pt denied f/c, cp, sob, abd pain, n/v/d, hematochezia, melena, hematemesis or hemoptysis.   Daily brown stools. NO melena or rectal bleeding  in ED had CHEMO with green-brown stool documented    In the ED, afebrile, tachy to 115, hypertensive to 197/95, on RA.   CBC w/ wbc 7.13, hgb 7.7, plt 357.   Coag wnl.   CMP w/ Na 138, K 4.3, SCr 0.69.   Trop 17, 16. ProBNP 116.   CXR clear.   GUIAC +    Pt received 1u prbc and 40 ivp lasix for physical exam consistent w/ fluid overload.  (11 Jun 2025 22:45)    Primary GI - Dr Panda  Last colonoscopy ~8 years ago; pt states she was told she is too high risk for any future colonoscopies 2/2 scar tissue from previous pelvic XRT      PAST MEDICAL & SURGICAL HISTORY:  DM (diabetes mellitus)    Benign essential HTN    HLD (hyperlipidemia)    Uterine CA sp Hysterectomy and pelvic XRT      Allergies  No Known Allergies      MEDICATIONS  (STANDING):  dextrose 5%. 1000 milliLiter(s) (50 mL/Hr) IV Continuous <Continuous>  dextrose 5%. 1000 milliLiter(s) (100 mL/Hr) IV Continuous <Continuous>  dextrose 50% Injectable 25 Gram(s) IV Push once  dextrose 50% Injectable 12.5 Gram(s) IV Push once  dextrose 50% Injectable 25 Gram(s) IV Push once  diltiazem    Tablet 120 milliGRAM(s) Oral <User Schedule>  glucagon  Injectable 1 milliGRAM(s) IntraMuscular once  insulin lispro (ADMELOG) corrective regimen sliding scale   SubCutaneous three times a day before meals  insulin lispro (ADMELOG) corrective regimen sliding scale   SubCutaneous at bedtime  pantoprazole  Injectable 40 milliGRAM(s) IV Push two times a day    MEDICATIONS  (PRN):  acetaminophen     Tablet .. 650 milliGRAM(s) Oral every 6 hours PRN Temp greater or equal to 38C (100.4F), Mild Pain (1 - 3)  aluminum hydroxide/magnesium hydroxide/simethicone Suspension 30 milliLiter(s) Oral every 4 hours PRN Dyspepsia  dextrose Oral Gel 15 Gram(s) Oral once PRN Blood Glucose LESS THAN 70 milliGRAM(s)/deciliter  melatonin 3 milliGRAM(s) Oral at bedtime PRN Insomnia  ondansetron Injectable 4 milliGRAM(s) IV Push every 8 hours PRN Nausea and/or Vomiting    Social History:  no reported toxic habits    Family History   IBD (  ) Yes   (X  ) No  GI Malignancy (  )  Yes    (X  ) No    Advanced Directives: (  X   ) None    (      ) DNR    (     ) DNI    (     ) Health Care Proxy:     Review of Systems:  see HPI- remainder 10 point ROS negative    Vital Signs Last 24 Hrs  T(C): 36.7 (12 Jun 2025 13:12), Max: 36.9 (11 Jun 2025 17:01)  T(F): 98 (12 Jun 2025 13:12), Max: 98.4 (11 Jun 2025 17:01)  HR: 99 (12 Jun 2025 13:12) (97 - 115)  BP: 154/80 (12 Jun 2025 13:12) (137/92 - 194/95)  BP(mean): --  RR: 18 (12 Jun 2025 13:12) (17 - 19)  SpO2: 96% (12 Jun 2025 13:12) (96% - 99%)    Parameters below as of 12 Jun 2025 13:12  Patient On (Oxygen Delivery Method): room air    PHYSICAL EXAM:    Constitutional: elderly WF NAD non toxic appearing  Neck: no JVD  Respiratory: grossly clear, no inc WOB  Cardiovascular: S1S2 regular  Gastrointestinal: soft, ND NT +BS   Extremities: no edema +arthritic joint deformities bl hands  Neurological: AOx3, no focal asymmetry  Psychiatric: calm, cooperative  Skin: No rashes, anicteric    LABS:                        9.5    9.53  )-----------( 357      ( 12 Jun 2025 07:05 )             31.3   Hemoglobin: 8.9 g/dL (06.12.25 @ 01:23)   Hemoglobin: 7.7 g/dL (06.11.25 @ 17:22)     Mean Cell Volume: 88.4 fl (06.11.25 @ 17:22)     Occult Blood, Feces: Positive (06.11.25 @ 19:23)     06-12    140  |  104  |  13  ----------------------------<  181[H]  4.0   |  21[L]  |  0.71    Ca    9.5      12 Jun 2025 07:05  Phos  2.7     06-12  Mg     1.7     06-12    TPro  6.7  /  Alb  3.8  /  TBili  0.4  /  DBili  x   /  AST  15  /  ALT  19  /  AlkPhos  141[H]  06-11    PT/INR - ( 11 Jun 2025 17:22 )   PT: 12.0 sec;   INR: 1.04 ratio    PTT - ( 11 Jun 2025 17:22 )  PTT:26.8 sec          RADIOLOGY & ADDITIONAL TESTS:    ACC: [id number] EXAM:  XR CHEST PORTABLE URGENT 1V   ORDERED BY: SERGIO MART     PROCEDURE DATE:  06/11/2025          INTERPRETATION:  EXAMINATION: XR CHEST URGENT    CLINICAL INDICATION: eval for pulm edema    TECHNIQUE: Single frontal, portable view of the chest was obtained.    COMPARISON: Chest x-ray 9/25/2005.    FINDINGS:    The heart is normal in size.  The lungs are clear.  There is no pneumothorax or pleural effusion.  No acute abnormalities in the visualized osseous structures.    IMPRESSION:  Clear lungs

## 2025-06-12 NOTE — CONSULT NOTE ADULT - NS ATTEND AMEND GEN_ALL_CORE FT
Agree with above. Patient with no overt bleeding. Occult blood is used for colon cancer screening. Patient at elevated risk for endoscopic procedures including colonoscopy, given prior radiation therapy to the abdomen. Would obtain goals of care discussions, monitor H/H. If there is overt signs of bleeding or continued decline in H/H, will reconsider endoscopic evaluation.

## 2025-06-12 NOTE — PHYSICAL THERAPY INITIAL EVALUATION ADULT - ADDITIONAL COMMENTS
Pt lives with son in pvt house with 1-2 steps to enter, none inside. Has chair lift to 2nd floor bedroom. Son able to assist needed. Has also dtr in law and other son to assist. Uses RW to amb within home. Requires assist for some ADL's

## 2025-06-12 NOTE — PHYSICAL THERAPY INITIAL EVALUATION ADULT - PERTINENT HX OF CURRENT PROBLEM, REHAB EVAL
Pt is a 89F PMHx HTN/HLD, DM, uterine ca s/p hysterectomy and RT. Pt went to PMD recently, had blood work, and was called today saying she was anemic and referred to ED for further eval. Pt endorsed some chronic dizziness, not worsened recently. Pt denied f/c, cp, sob, abd pain, n/v/d, hematochezia, melena, hematemesis or hemoptysis.   In the ED, afebrile, tachy to 115, hypertensive to 197/95, on RA. CBC w/ wbc 7.13, hgb 7.7, plt 357. Coag wnl. CMP w/ Na 138, K 4.3, SCr 0.69. Trop 17, 16. ProBNP 116. CXR clear. GUIAC +. Pt received 1u prbc and 40 ivp lasix for physical exam consistent w/ fluid overload.

## 2025-06-12 NOTE — PHYSICAL THERAPY INITIAL EVALUATION ADULT - GAIT DEVIATIONS NOTED, PT EVAL
decreased wilman/increased time in double stance/decreased step length/decreased stride length/decreased weight-shifting ability

## 2025-06-13 ENCOUNTER — TRANSCRIPTION ENCOUNTER (OUTPATIENT)
Age: 89
End: 2025-06-13

## 2025-06-13 VITALS
OXYGEN SATURATION: 97 % | SYSTOLIC BLOOD PRESSURE: 128 MMHG | TEMPERATURE: 98 F | RESPIRATION RATE: 18 BRPM | HEART RATE: 103 BPM | DIASTOLIC BLOOD PRESSURE: 70 MMHG

## 2025-06-13 LAB
A1C WITH ESTIMATED AVERAGE GLUCOSE RESULT: 6.9 % — HIGH (ref 4–5.6)
ANION GAP SERPL CALC-SCNC: 14 MMOL/L — SIGNIFICANT CHANGE UP (ref 5–17)
BUN SERPL-MCNC: 17 MG/DL — SIGNIFICANT CHANGE UP (ref 7–23)
CALCIUM SERPL-MCNC: 9.3 MG/DL — SIGNIFICANT CHANGE UP (ref 8.4–10.5)
CHLORIDE SERPL-SCNC: 102 MMOL/L — SIGNIFICANT CHANGE UP (ref 96–108)
CO2 SERPL-SCNC: 23 MMOL/L — SIGNIFICANT CHANGE UP (ref 22–31)
CREAT SERPL-MCNC: 0.8 MG/DL — SIGNIFICANT CHANGE UP (ref 0.5–1.3)
EGFR: 68 ML/MIN/1.73M2 — SIGNIFICANT CHANGE UP
EGFR: 68 ML/MIN/1.73M2 — SIGNIFICANT CHANGE UP
ESTIMATED AVERAGE GLUCOSE: 151 MG/DL — HIGH (ref 68–114)
GLUCOSE BLDC GLUCOMTR-MCNC: 191 MG/DL — HIGH (ref 70–99)
GLUCOSE BLDC GLUCOMTR-MCNC: 196 MG/DL — HIGH (ref 70–99)
GLUCOSE BLDC GLUCOMTR-MCNC: 225 MG/DL — HIGH (ref 70–99)
GLUCOSE SERPL-MCNC: 175 MG/DL — HIGH (ref 70–99)
HCT VFR BLD CALC: 28.2 % — LOW (ref 34.5–45)
HGB BLD-MCNC: 9 G/DL — LOW (ref 11.5–15.5)
MCHC RBC-ENTMCNC: 27.5 PG — SIGNIFICANT CHANGE UP (ref 27–34)
MCHC RBC-ENTMCNC: 31.9 G/DL — LOW (ref 32–36)
MCV RBC AUTO: 86.2 FL — SIGNIFICANT CHANGE UP (ref 80–100)
NRBC BLD AUTO-RTO: 0 /100 WBCS — SIGNIFICANT CHANGE UP (ref 0–0)
PLATELET # BLD AUTO: 377 K/UL — SIGNIFICANT CHANGE UP (ref 150–400)
POTASSIUM SERPL-MCNC: 4.2 MMOL/L — SIGNIFICANT CHANGE UP (ref 3.5–5.3)
POTASSIUM SERPL-SCNC: 4.2 MMOL/L — SIGNIFICANT CHANGE UP (ref 3.5–5.3)
RBC # BLD: 3.27 M/UL — LOW (ref 3.8–5.2)
RBC # FLD: 14 % — SIGNIFICANT CHANGE UP (ref 10.3–14.5)
SODIUM SERPL-SCNC: 139 MMOL/L — SIGNIFICANT CHANGE UP (ref 135–145)
WBC # BLD: 10.87 K/UL — HIGH (ref 3.8–10.5)
WBC # FLD AUTO: 10.87 K/UL — HIGH (ref 3.8–10.5)

## 2025-06-13 PROCEDURE — 86901 BLOOD TYPING SEROLOGIC RH(D): CPT

## 2025-06-13 PROCEDURE — 97162 PT EVAL MOD COMPLEX 30 MIN: CPT

## 2025-06-13 PROCEDURE — 86900 BLOOD TYPING SEROLOGIC ABO: CPT

## 2025-06-13 PROCEDURE — 85730 THROMBOPLASTIN TIME PARTIAL: CPT

## 2025-06-13 PROCEDURE — 83880 ASSAY OF NATRIURETIC PEPTIDE: CPT

## 2025-06-13 PROCEDURE — 74177 CT ABD & PELVIS W/CONTRAST: CPT

## 2025-06-13 PROCEDURE — 83036 HEMOGLOBIN GLYCOSYLATED A1C: CPT

## 2025-06-13 PROCEDURE — 96374 THER/PROPH/DIAG INJ IV PUSH: CPT

## 2025-06-13 PROCEDURE — 84484 ASSAY OF TROPONIN QUANT: CPT

## 2025-06-13 PROCEDURE — 86923 COMPATIBILITY TEST ELECTRIC: CPT

## 2025-06-13 PROCEDURE — 85610 PROTHROMBIN TIME: CPT

## 2025-06-13 PROCEDURE — 82962 GLUCOSE BLOOD TEST: CPT

## 2025-06-13 PROCEDURE — 86850 RBC ANTIBODY SCREEN: CPT

## 2025-06-13 PROCEDURE — 36430 TRANSFUSION BLD/BLD COMPNT: CPT

## 2025-06-13 PROCEDURE — 83735 ASSAY OF MAGNESIUM: CPT

## 2025-06-13 PROCEDURE — 93010 ELECTROCARDIOGRAM REPORT: CPT

## 2025-06-13 PROCEDURE — P9016: CPT

## 2025-06-13 PROCEDURE — 85027 COMPLETE CBC AUTOMATED: CPT

## 2025-06-13 PROCEDURE — 85025 COMPLETE CBC W/AUTO DIFF WBC: CPT

## 2025-06-13 PROCEDURE — 82272 OCCULT BLD FECES 1-3 TESTS: CPT

## 2025-06-13 PROCEDURE — 80053 COMPREHEN METABOLIC PANEL: CPT

## 2025-06-13 PROCEDURE — 99239 HOSP IP/OBS DSCHRG MGMT >30: CPT

## 2025-06-13 PROCEDURE — 93005 ELECTROCARDIOGRAM TRACING: CPT

## 2025-06-13 PROCEDURE — 80048 BASIC METABOLIC PNL TOTAL CA: CPT

## 2025-06-13 PROCEDURE — 99285 EMERGENCY DEPT VISIT HI MDM: CPT

## 2025-06-13 PROCEDURE — 84100 ASSAY OF PHOSPHORUS: CPT

## 2025-06-13 PROCEDURE — 71045 X-RAY EXAM CHEST 1 VIEW: CPT

## 2025-06-13 RX ORDER — ACETAMINOPHEN 500 MG/5ML
2 LIQUID (ML) ORAL
Qty: 0 | Refills: 0 | DISCHARGE
Start: 2025-06-13

## 2025-06-13 RX ORDER — ASPIRIN 325 MG
0 TABLET ORAL
Refills: 0 | DISCHARGE

## 2025-06-13 RX ADMIN — Medication 4 MILLIGRAM(S): at 05:12

## 2025-06-13 RX ADMIN — INSULIN LISPRO 2: 100 INJECTION, SOLUTION INTRAVENOUS; SUBCUTANEOUS at 17:11

## 2025-06-13 RX ADMIN — Medication 40 MILLIGRAM(S): at 05:12

## 2025-06-13 RX ADMIN — INSULIN LISPRO 1: 100 INJECTION, SOLUTION INTRAVENOUS; SUBCUTANEOUS at 08:18

## 2025-06-13 RX ADMIN — INSULIN LISPRO 1: 100 INJECTION, SOLUTION INTRAVENOUS; SUBCUTANEOUS at 12:19

## 2025-06-13 RX ADMIN — Medication 30 MILLILITER(S): at 05:12

## 2025-06-13 NOTE — PROGRESS NOTE ADULT - PROBLEM SELECTOR PLAN 6
VTE ppx: scd. holding chemical ppx d/t GIB  GI ppx: ppi iv  PT reccs home PT
VTE ppx: scd. holding chemical ppx d/t GIB  GI ppx: ppi   PT reccs home PT

## 2025-06-13 NOTE — DISCHARGE NOTE PROVIDER - NSDCCPCAREPLAN_GEN_ALL_CORE_FT
PRINCIPAL DISCHARGE DIAGNOSIS  Diagnosis: Anemia  Assessment and Plan of Treatment: You were sent to the ED by your PCP for worsening anemia. Hgb was 7.1 and you recieved 1 unit PRBCs and IV lasix. You were seen and evaluated by GI for GIB. Your were declined colonoscopy and was started on PPI and had CT A/P noting -------      SECONDARY DISCHARGE DIAGNOSES  Diagnosis: Uncontrolled hypertension  Assessment and Plan of Treatment: Continue with your blood pressure medications; eat a heart healthy diet with low salt diet; exercise regularly (consult with your physician or cardiologist first); maintain a heart healthy weight; if you smoke - quit (A resource to help you stop smoking is the [facility] for Tobacco Control – phone number [phone].); include healthy ways to manage stress. Continue to follow with your primary care physician or cardiologist.    Diagnosis: DM (diabetes mellitus)  Assessment and Plan of Treatment: HgA1C this admission.  Make sure you get your HgA1c checked every three months.  If you take oral diabetes medications, check your blood glucose two times a day.  If you take insulin, check your blood glucose before meals and at bedtime.  It's important not to skip any meals.  Keep a log of your blood glucose results and always take it with you to your doctor appointments.  Keep a list of your current medications including injectables and over the counter medications and bring this medication list with you to all your doctor appointments.  If you have not seen your ophthalmologist this year call for appointment.  Check your feet daily for redness, sores, or openings. Do not self treat. If no improvement in two days call your primary care physician for an appointment.  Low blood sugar (hypoglycemia) is a blood sugar below 70mg/dl. Check your blood sugar if you feel signs/symptoms of hypoglycemia. If your blood sugar is below 70 take 15 grams of carbohydrates (ex 4 oz of apple juice, 3-4 glucose tablets, or 4-6 oz of regular soda) wait 15 minutes and repeat blood sugar to make sure it comes up above 70.  If your blood sugar is above 70 and you are due for a meal, have a meal.  If you are not due for a meal have a snack.  This snack helps keeps your blood sugar at a safe range.    Diagnosis: HLD (hyperlipidemia)  Assessment and Plan of Treatment: Low salt, low fat, low cholesterol, diabetic diet if appropriate  Continue medication as prescribed  Exercise, increase your activity level  Pt. verbalized an understanding of all instructions.     PRINCIPAL DISCHARGE DIAGNOSIS  Diagnosis: Anemia  Assessment and Plan of Treatment: You were sent to the ED by your PCP for worsening anemia. Hgb was 7.1 and you recieved 1 unit PRBCs and IV lasix. You were seen and evaluated by GI for GIB. Your were declined colonoscopy and was started on PPI and had CT A/P without acute findings. Please continue all medications as prescribed, follow-up with PCP within 1-2 weeks of discharge      SECONDARY DISCHARGE DIAGNOSES  Diagnosis: Uncontrolled hypertension  Assessment and Plan of Treatment: Continue with your blood pressure medications; eat a heart healthy diet with low salt diet; exercise regularly (consult with your physician or cardiologist first); maintain a heart healthy weight; if you smoke - quit (A resource to help you stop smoking is the [facility] for Tobacco Control – phone number [phone].); include healthy ways to manage stress. Continue to follow with your primary care physician or cardiologist.    Diagnosis: DM (diabetes mellitus)  Assessment and Plan of Treatment: HgA1C this admission.  Make sure you get your HgA1c checked every three months.  If you take oral diabetes medications, check your blood glucose two times a day.  If you take insulin, check your blood glucose before meals and at bedtime.  It's important not to skip any meals.  Keep a log of your blood glucose results and always take it with you to your doctor appointments.  Keep a list of your current medications including injectables and over the counter medications and bring this medication list with you to all your doctor appointments.  If you have not seen your ophthalmologist this year call for appointment.  Check your feet daily for redness, sores, or openings. Do not self treat. If no improvement in two days call your primary care physician for an appointment.  Low blood sugar (hypoglycemia) is a blood sugar below 70mg/dl. Check your blood sugar if you feel signs/symptoms of hypoglycemia. If your blood sugar is below 70 take 15 grams of carbohydrates (ex 4 oz of apple juice, 3-4 glucose tablets, or 4-6 oz of regular soda) wait 15 minutes and repeat blood sugar to make sure it comes up above 70.  If your blood sugar is above 70 and you are due for a meal, have a meal.  If you are not due for a meal have a snack.  This snack helps keeps your blood sugar at a safe range.    Diagnosis: HLD (hyperlipidemia)  Assessment and Plan of Treatment: Low salt, low fat, low cholesterol, diabetic diet if appropriate  Continue medication as prescribed  Exercise, increase your activity level  Pt. verbalized an understanding of all instructions.    Diagnosis: GI bleed  Assessment and Plan of Treatment: You were sent to the ED by your PCP for worsening anemia. Hgb was 7.1 and you recieved 1 unit PRBCs and IV lasix. You were seen and evaluated by GI for GIB. Your were declined colonoscopy and was started on PPI and had CT A/P without acute findings.

## 2025-06-13 NOTE — DISCHARGE NOTE NURSING/CASE MANAGEMENT/SOCIAL WORK - NSDCPEFALRISK_GEN_ALL_CORE
For information on Fall & Injury Prevention, visit: [email or web address] OR  [email or web address] OR  https://www.cdc.gov/steadi/patient.html

## 2025-06-13 NOTE — DISCHARGE NOTE PROVIDER - HOSPITAL COURSE
HPI:  89F PMHx HTN/HLD, DM, uterine ca s/p hysterectomy and RT. Pt went to PMD recently, had blood work, and was called today saying she was anemic and referred to ED for further eval. Pt endorsed some chronic dizziness, not worsened recently. Pt denied f/c, cp, sob, abd pain, n/v/d, hematochezia, melena, hematemesis or hemoptysis.     In the ED, afebrile, tachy to 115, hypertensive to 197/95, on RA.   CBC w/ wbc 7.13, hgb 7.7, plt 357.   Coag wnl.   CMP w/ Na 138, K 4.3, SCr 0.69.   Trop 17, 16. ProBNP 116.   CXR clear.   GUIAC +    Pt received 1u prbc and 40 ivp lasix for physical exam consistent w/ fluid overload.  (11 Jun 2025 22:45)    Hospital Course:      Important Medication Changes and Reason:    Active or Pending Issues Requiring Follow-up:    Advanced Directives:   [ x] Full code  [ ] DNR  [ ] Hospice    Discharge Diagnoses:  GIB  Benign essential HTN   DM (diabetes mellitus)   HLD (hyperlipidemia).       89F PMHx HTN/HLD, DM, uterine ca s/p hysterectomy and RT. Pt went to PMD recently, had blood work, and was called today saying she was anemic and referred to ED for further eval. Pt endorsed some chronic dizziness, not worsened recently. Pt denied f/c, cp, sob, abd pain, n/v/d, hematochezia, melena, hematemesis or hemoptysis.     In the ED, afebrile, tachy to 115, hypertensive to 197/95, on RA.   CBC w/ wbc 7.13, hgb 7.7, plt 357.   Coag wnl.   CMP w/ Na 138, K 4.3, SCr 0.69.   Trop 17, 16. ProBNP 116.   CXR clear.   GUIAC +  Pt received 1u prbc and 40 ivp lasix for physical exam consistent w/ fluid overload.  (11 Jun 2025 22:45)    Hospital Course: This 89-year-old female presented with anemia and a positive fecal occult blood test. Her past medical history includes hypertension, hyperlipidemia, diabetes mellitus, and uterine cancer treated with hysterectomy and radiation. She reported no melena or overt rectal bleeding, but a digital rectal exam revealed green-brown stool. After receiving one unit of packed red blood cells, her hemoglobin improved from 7.7 to 8.9 g/dL. Due to her advanced age and prior pelvic radiation, she declined a colonoscopy, opting for virtual colonoscopy with outpatient follow-up. CT A/P without acute findings. During her hospitalization, she was treated with intravenous protonix for her gastrointestinal bleed and her home antihypertensive medication was continued. Her diabetes was managed with sliding scale insulin, and her home oral hypoglycemics were held. Although she had lower extremity edema, a recent echocardiogram was reportedly normal. She declined further inpatient evaluation and will follow up with her cardiologist regarding the edema and her use of Lasix. Aspirin was held due to the GI bleed. She was discharged home in stable condition with instructions to follow up with her gastroenterologist, cardiologist, and primary care physician. She will continue using her rolling walker, chair lift, and shower bench at home.      Important Medication Changes and Reason:    Active or Pending Issues Requiring Follow-up:    Advanced Directives:   [ x] Full code  [ ] DNR  [ ] Hospice    Discharge Diagnoses:  GIB  Benign essential HTN   DM (diabetes mellitus)   HLD (hyperlipidemia).

## 2025-06-13 NOTE — DISCHARGE NOTE PROVIDER - PROVIDER TOKENS
PROVIDER:[TOKEN:[[id number]],FOLLOWUP:[1 week]],PROVIDER:[TOKEN:[[id number]],FOLLOWUP:[1 week]]

## 2025-06-13 NOTE — PROGRESS NOTE ADULT - PROBLEM SELECTOR PLAN 5
-no longer on medication. Unclear if stopped by physician or self-discontinued.
-no longer on medication. Unclear if stopped by physician or self-discontinued.

## 2025-06-13 NOTE — DISCHARGE NOTE NURSING/CASE MANAGEMENT/SOCIAL WORK - FINANCIAL ASSISTANCE
[facility] provides services at a reduced cost to those who are determined to be eligible through [facility]’s financial assistance program. Information regarding [facility]’s financial assistance program can be found by going to [email or web address] or by calling [phone].

## 2025-06-13 NOTE — PROGRESS NOTE ADULT - TIME BILLING
case complexity and discharge planning. Pt is clinically stable for discharge home. To follow up with GI in 1-2 weeks.    d/w BOO Buckner

## 2025-06-13 NOTE — DISCHARGE NOTE PROVIDER - CARE PROVIDER_API CALL
Stuart King  [address] [place] [address] [place] [address]  Phone: [phone]  Follow Up Time: 1 week    Cecil Wong  Physician Assistant  [address] [place] [id number]  Phone: [phone]  Fax: [phone]  Follow Up Time: 1 week

## 2025-06-13 NOTE — DISCHARGE NOTE NURSING/CASE MANAGEMENT/SOCIAL WORK - PATIENT PORTAL LINK FT
You can access the FollowMyHealth Patient Portal offered by [facility] by registering at the following website: [email or web address]. By joining [employer]’s FollowMyHealth portal, you will also be able to view your health information using other applications (apps) compatible with our system.

## 2025-06-13 NOTE — PROGRESS NOTE ADULT - PROBLEM SELECTOR PLAN 3
-no crackles on lung exam. cxr clear. probnp not elevated.   -reportedly had echo 2wks ago, was told "normal". pt declines rpt tte here in the hospital.   pt to f/u with outpatient cardiology after discharge  was on lasix qod for LE edema ;  stopped it herself 1mo ago due to feeling dizzy when she takes it.
-no crackles on lung exam. cxr clear. probnp not elevated.   -reportedly had echo 2wks ago, was told "normal". pt declines rpt tte here in the hospital.   pt to f/u with outpatient cardiology after discharge  was on lasix qod for LE edema ;  stopped it herself 1mo ago due to feeling dizzy when she takes it.

## 2025-06-13 NOTE — DISCHARGE NOTE PROVIDER - CARE PROVIDERS DIRECT ADDRESSES
[email or web address]

## 2025-06-13 NOTE — PROGRESS NOTE ADULT - SUBJECTIVE AND OBJECTIVE BOX
Patient is a 89y old  Female who presents with a chief complaint of     SUBJECTIVE / OVERNIGHT EVENTS:  Pt seen and examined with family at bedside. No acute events overnight. She denies cp, sob. Denies melena, hematochezia.    MEDICATIONS  (STANDING):  dextrose 5%. 1000 milliLiter(s) (50 mL/Hr) IV Continuous <Continuous>  dextrose 5%. 1000 milliLiter(s) (100 mL/Hr) IV Continuous <Continuous>  dextrose 50% Injectable 25 Gram(s) IV Push once  dextrose 50% Injectable 12.5 Gram(s) IV Push once  dextrose 50% Injectable 25 Gram(s) IV Push once  diltiazem    Tablet 120 milliGRAM(s) Oral <User Schedule>  glucagon  Injectable 1 milliGRAM(s) IntraMuscular once  insulin lispro (ADMELOG) corrective regimen sliding scale   SubCutaneous three times a day before meals  insulin lispro (ADMELOG) corrective regimen sliding scale   SubCutaneous at bedtime  melatonin 5 milliGRAM(s) Oral at bedtime  pantoprazole    Tablet 40 milliGRAM(s) Oral before breakfast    MEDICATIONS  (PRN):  acetaminophen     Tablet .. 650 milliGRAM(s) Oral every 6 hours PRN Temp greater or equal to 38C (100.4F), Mild Pain (1 - 3)  aluminum hydroxide/magnesium hydroxide/simethicone Suspension 30 milliLiter(s) Oral every 4 hours PRN Dyspepsia  dextrose Oral Gel 15 Gram(s) Oral once PRN Blood Glucose LESS THAN 70 milliGRAM(s)/deciliter  ondansetron Injectable 4 milliGRAM(s) IV Push every 8 hours PRN Nausea and/or Vomiting      Vital Signs Last 24 Hrs  T(C): 36.4 (13 Jun 2025 11:00), Max: 36.8 (13 Jun 2025 04:30)  T(F): 97.6 (13 Jun 2025 11:00), Max: 98.2 (13 Jun 2025 04:30)  HR: 90 (13 Jun 2025 11:00) (90 - 102)  BP: 133/76 (13 Jun 2025 11:00) (133/76 - 155/83)  BP(mean): --  RR: 18 (13 Jun 2025 11:00) (18 - 18)  SpO2: 96% (13 Jun 2025 11:00) (95% - 96%)    Parameters below as of 13 Jun 2025 11:00  Patient On (Oxygen Delivery Method): room air      CAPILLARY BLOOD GLUCOSE      POCT Blood Glucose.: 196 mg/dL (13 Jun 2025 11:33)  POCT Blood Glucose.: 191 mg/dL (13 Jun 2025 07:47)  POCT Blood Glucose.: 174 mg/dL (12 Jun 2025 21:24)  POCT Blood Glucose.: 142 mg/dL (12 Jun 2025 16:48)    I&O's Summary    12 Jun 2025 07:01  -  13 Jun 2025 07:00  --------------------------------------------------------  IN: 220 mL / OUT: 1100 mL / NET: -880 mL    13 Jun 2025 07:01  -  13 Jun 2025 16:14  --------------------------------------------------------  IN: 720 mL / OUT: 750 mL / NET: -30 mL        PHYSICAL EXAM:  GENERAL: NAD, well-groomed  HEAD:  Atraumatic, Normocephalic  EYES: conjunctiva and sclera clear  NECK: Supple, No JVD  CHEST/LUNG: Clear to auscultation bilaterally; No wheeze  HEART: Regular rate and rhythm; No murmurs, rubs, or gallops  ABDOMEN: Soft, Nontender, Nondistended; Bowel sounds present  EXTREMITIES:  2+ Peripheral Pulses, No clubbing, cyanosis, or edema  PSYCH: AAOx3  NEUROLOGY: non-focal  SKIN: No rashes or lesions    LABS:                        9.0    10.87 )-----------( 377      ( 13 Jun 2025 06:19 )             28.2     06-13    139  |  102  |  17  ----------------------------<  175[H]  4.2   |  23  |  0.80    Ca    9.3      13 Jun 2025 06:19  Phos  2.7     06-12  Mg     1.7     06-12    TPro  6.7  /  Alb  3.8  /  TBili  0.4  /  DBili  x   /  AST  15  /  ALT  19  /  AlkPhos  141[H]  06-11    PT/INR - ( 11 Jun 2025 17:22 )   PT: 12.0 sec;   INR: 1.04 ratio         PTT - ( 11 Jun 2025 17:22 )  PTT:26.8 sec      Urinalysis Basic - ( 13 Jun 2025 06:19 )    Color: x / Appearance: x / SG: x / pH: x  Gluc: 175 mg/dL / Ketone: x  / Bili: x / Urobili: x   Blood: x / Protein: x / Nitrite: x   Leuk Esterase: x / RBC: x / WBC x   Sq Epi: x / Non Sq Epi: x / Bacteria: x      CTAP 6/12  *  No evidence of acute abnormality in the abdomen and pelvis.  *  Cholelithiasis.  *  Uncomplicated diverticulosis.  *  Moderate amount of stool throughout tortuous nondilated colon.        Consultant(s) Notes Reviewed:  GI    
Patient is a 89y old  Female who presents with a chief complaint of     SUBJECTIVE / OVERNIGHT EVENTS:  Pt seen and examined. No acute events overnight. She c/o generalized fatigue.    MEDICATIONS  (STANDING):  dextrose 5%. 1000 milliLiter(s) (50 mL/Hr) IV Continuous <Continuous>  dextrose 5%. 1000 milliLiter(s) (100 mL/Hr) IV Continuous <Continuous>  dextrose 50% Injectable 25 Gram(s) IV Push once  dextrose 50% Injectable 12.5 Gram(s) IV Push once  dextrose 50% Injectable 25 Gram(s) IV Push once  diltiazem    Tablet 120 milliGRAM(s) Oral <User Schedule>  glucagon  Injectable 1 milliGRAM(s) IntraMuscular once  insulin lispro (ADMELOG) corrective regimen sliding scale   SubCutaneous three times a day before meals  insulin lispro (ADMELOG) corrective regimen sliding scale   SubCutaneous at bedtime  pantoprazole  Injectable 40 milliGRAM(s) IV Push two times a day    MEDICATIONS  (PRN):  acetaminophen     Tablet .. 650 milliGRAM(s) Oral every 6 hours PRN Temp greater or equal to 38C (100.4F), Mild Pain (1 - 3)  aluminum hydroxide/magnesium hydroxide/simethicone Suspension 30 milliLiter(s) Oral every 4 hours PRN Dyspepsia  dextrose Oral Gel 15 Gram(s) Oral once PRN Blood Glucose LESS THAN 70 milliGRAM(s)/deciliter  melatonin 3 milliGRAM(s) Oral at bedtime PRN Insomnia  ondansetron Injectable 4 milliGRAM(s) IV Push every 8 hours PRN Nausea and/or Vomiting      Vital Signs Last 24 Hrs  T(C): 36.7 (12 Jun 2025 14:56), Max: 36.9 (11 Jun 2025 17:01)  T(F): 98 (12 Jun 2025 14:56), Max: 98.4 (11 Jun 2025 17:01)  HR: 99 (12 Jun 2025 14:56) (94 - 115)  BP: 153/83 (12 Jun 2025 14:56) (137/92 - 194/95)  BP(mean): --  RR: 18 (12 Jun 2025 14:56) (17 - 19)  SpO2: 97% (12 Jun 2025 14:56) (96% - 99%)    Parameters below as of 12 Jun 2025 14:56  Patient On (Oxygen Delivery Method): room air      CAPILLARY BLOOD GLUCOSE      POCT Blood Glucose.: 220 mg/dL (12 Jun 2025 10:56)  POCT Blood Glucose.: 196 mg/dL (12 Jun 2025 07:36)    I&O's Summary    12 Jun 2025 07:01  -  12 Jun 2025 16:45  --------------------------------------------------------  IN: 0 mL / OUT: 0 mL / NET: 0 mL        PHYSICAL EXAM:  GENERAL: NAD, well-groomed  HEAD:  Atraumatic, Normocephalic  EYES: conjunctiva and sclera clear  NECK: Supple, No JVD  CHEST/LUNG: Clear to auscultation bilaterally; No wheeze  HEART: Regular rate and rhythm; No murmurs, rubs, or gallops  ABDOMEN: Soft, Nontender, Nondistended; Bowel sounds present  EXTREMITIES:  2+ Peripheral Pulses, No clubbing, cyanosis, or edema  PSYCH: AAOx3  NEUROLOGY: non-focal  SKIN: No rashes or lesions    LABS:                        9.5    9.53  )-----------( 357      ( 12 Jun 2025 07:05 )             31.3     06-12    140  |  104  |  13  ----------------------------<  181[H]  4.0   |  21[L]  |  0.71    Ca    9.5      12 Jun 2025 07:05  Phos  2.7     06-12  Mg     1.7     06-12    TPro  6.7  /  Alb  3.8  /  TBili  0.4  /  DBili  x   /  AST  15  /  ALT  19  /  AlkPhos  141[H]  06-11    PT/INR - ( 11 Jun 2025 17:22 )   PT: 12.0 sec;   INR: 1.04 ratio         PTT - ( 11 Jun 2025 17:22 )  PTT:26.8 sec      Urinalysis Basic - ( 12 Jun 2025 07:05 )    Color: x / Appearance: x / SG: x / pH: x  Gluc: 181 mg/dL / Ketone: x  / Bili: x / Urobili: x   Blood: x / Protein: x / Nitrite: x   Leuk Esterase: x / RBC: x / WBC x   Sq Epi: x / Non Sq Epi: x / Bacteria: x

## 2025-06-13 NOTE — DISCHARGE NOTE PROVIDER - NSDCMRMEDTOKEN_GEN_ALL_CORE_FT
aspirin 81 mg oral tablet: orally  dilTIAZem 120 mg oral tablet: 1 tab(s) orally  MetFORMIN (Eqv-Glucophage XR) 500 mg oral tablet, extended release: 2 tab(s) orally 2 times a day 1 tab(s) orally 3 times a day (with meals)   acetaminophen 325 mg oral tablet: 2 tab(s) orally every 6 hours As needed Temp greater or equal to 38C (100.4F), Mild Pain (1 - 3)  dilTIAZem 120 mg oral tablet: 1 tab(s) orally  MetFORMIN (Eqv-Glucophage XR) 500 mg oral tablet, extended release: 2 tab(s) orally 2 times a day 1 tab(s) orally 3 times a day (with meals)  pantoprazole 40 mg oral delayed release tablet: 1 tab(s) orally once a day (before a meal)

## 2025-06-13 NOTE — PROGRESS NOTE ADULT - PROBLEM SELECTOR PLAN 1
+ FOBT  -reports last colonoscopy was >10yrs ago, had removed polyps before, but she was told that colonoscopy for her was no longer recommended (due to difficult exam as pt has h/o radiation to the area from uterine ca).   - s/p 1u prbc in the ED; Hb stable  -c/w protonix iv 40   -pt states that she would want virtual colonoscopy or capsule endoscopy  - GI consulted   - ASA on hold
+ FOBT  -reports last colonoscopy was >10yrs ago, had removed polyps before, but she was told that colonoscopy for her was no longer recommended (due to difficult exam as pt has h/o radiation to the area from uterine ca).   - s/p 1u prbc in the ED; Hb stable  - c/w PO protonix 40   - GI reccs appreciated ; CT to evaluate for possible mass/occult lesion per pt request  - CTAP is inremarkable  - No plans for endoluminal evaluation  - Can follow up with primary outpt GI after DC (Dr EUSEBIO Panda)  - c/t hold ASA  - Pt is clinically stable for discharge

## 2025-07-16 ENCOUNTER — APPOINTMENT (OUTPATIENT)
Dept: GASTROENTEROLOGY | Facility: CLINIC | Age: 89
End: 2025-07-16
Payer: MEDICARE

## 2025-07-16 VITALS
SYSTOLIC BLOOD PRESSURE: 163 MMHG | WEIGHT: 200 LBS | TEMPERATURE: 97.3 F | BODY MASS INDEX: 35.44 KG/M2 | OXYGEN SATURATION: 97 % | HEIGHT: 63 IN | DIASTOLIC BLOOD PRESSURE: 85 MMHG | HEART RATE: 102 BPM

## 2025-07-16 PROBLEM — D62 ANEMIA DUE TO ACUTE BLOOD LOSS: Status: ACTIVE | Noted: 2025-07-16

## 2025-07-16 PROCEDURE — 99214 OFFICE O/P EST MOD 30 MIN: CPT

## 2025-07-16 PROCEDURE — G2211 COMPLEX E/M VISIT ADD ON: CPT

## 2025-07-16 RX ORDER — ATORVASTATIN CALCIUM 10 MG/1
10 TABLET, FILM COATED ORAL
Refills: 0 | Status: ACTIVE | COMMUNITY

## 2025-07-16 RX ORDER — VALSARTAN 160 MG/1
160 TABLET, COATED ORAL
Refills: 0 | Status: ACTIVE | COMMUNITY

## 2025-07-16 RX ORDER — MESALAMINE 1000 MG/1
1000 SUPPOSITORY RECTAL
Qty: 30 | Refills: 2 | Status: ACTIVE | COMMUNITY
Start: 2025-07-16 | End: 1900-01-01

## 2025-08-12 ENCOUNTER — TRANSCRIPTION ENCOUNTER (OUTPATIENT)
Age: 89
End: 2025-08-12

## 2025-08-13 ENCOUNTER — APPOINTMENT (OUTPATIENT)
Dept: CT IMAGING | Facility: CLINIC | Age: 89
End: 2025-08-13
Payer: MEDICARE

## 2025-08-13 ENCOUNTER — OUTPATIENT (OUTPATIENT)
Dept: OUTPATIENT SERVICES | Facility: HOSPITAL | Age: 89
LOS: 1 days | End: 2025-08-13
Payer: MEDICARE

## 2025-08-13 DIAGNOSIS — K62.7 RADIATION PROCTITIS: ICD-10-CM

## 2025-08-13 DIAGNOSIS — D62 ACUTE POSTHEMORRHAGIC ANEMIA: ICD-10-CM

## 2025-08-13 DIAGNOSIS — K63.5 POLYP OF COLON: ICD-10-CM

## 2025-08-13 PROCEDURE — 74263 CT COLONOGRAPHY SCREENING: CPT | Mod: 26

## 2025-08-13 PROCEDURE — 74263 CT COLONOGRAPHY SCREENING: CPT

## 2025-08-20 ENCOUNTER — APPOINTMENT (OUTPATIENT)
Dept: GASTROENTEROLOGY | Facility: CLINIC | Age: 89
End: 2025-08-20
Payer: MEDICARE

## 2025-08-20 VITALS
WEIGHT: 200 LBS | BODY MASS INDEX: 35.44 KG/M2 | OXYGEN SATURATION: 96 % | SYSTOLIC BLOOD PRESSURE: 164 MMHG | HEIGHT: 63 IN | HEART RATE: 90 BPM | DIASTOLIC BLOOD PRESSURE: 85 MMHG

## 2025-08-20 DIAGNOSIS — K62.7 RADIATION PROCTITIS: ICD-10-CM

## 2025-08-20 DIAGNOSIS — D62 ACUTE POSTHEMORRHAGIC ANEMIA: ICD-10-CM

## 2025-08-20 DIAGNOSIS — Z85.42 PERSONAL HISTORY OF MALIGNANT NEOPLASM OF OTHER PARTS OF UTERUS: ICD-10-CM

## 2025-08-20 LAB
CEA SERPL-MCNC: 9.2 NG/ML
HCT VFR BLD CALC: 32.6 %
HGB BLD-MCNC: 10.2 G/DL
MCHC RBC-ENTMCNC: 27.6 PG
MCHC RBC-ENTMCNC: 31.3 G/DL
MCV RBC AUTO: 88.3 FL
PLATELET # BLD AUTO: 292 K/UL
RBC # BLD: 3.69 M/UL
RBC # FLD: 19.9 %
WBC # FLD AUTO: 7.17 K/UL

## 2025-08-20 PROCEDURE — 99214 OFFICE O/P EST MOD 30 MIN: CPT

## 2025-08-20 PROCEDURE — G2211 COMPLEX E/M VISIT ADD ON: CPT

## 2025-08-21 ENCOUNTER — APPOINTMENT (OUTPATIENT)
Dept: SURGERY | Facility: CLINIC | Age: 89
End: 2025-08-21
Payer: MEDICARE

## 2025-08-21 VITALS
BODY MASS INDEX: 35.44 KG/M2 | WEIGHT: 200 LBS | DIASTOLIC BLOOD PRESSURE: 72 MMHG | OXYGEN SATURATION: 98 % | HEART RATE: 83 BPM | RESPIRATION RATE: 17 BRPM | HEIGHT: 63 IN | TEMPERATURE: 96.4 F | SYSTOLIC BLOOD PRESSURE: 154 MMHG

## 2025-08-21 DIAGNOSIS — K63.89 OTHER SPECIFIED DISEASES OF INTESTINE: ICD-10-CM

## 2025-08-21 PROCEDURE — 99205 OFFICE O/P NEW HI 60 MIN: CPT

## 2025-08-21 RX ORDER — PSYLLIUM SEED
PACKET (EA) ORAL
Refills: 0 | Status: ACTIVE | COMMUNITY

## 2025-08-25 RX ORDER — METRONIDAZOLE 250 MG/1
250 TABLET ORAL
Qty: 3 | Refills: 0 | Status: ACTIVE | COMMUNITY
Start: 2025-08-25 | End: 1900-01-01

## 2025-08-25 RX ORDER — NEOMYCIN SULFATE 500 MG/1
500 TABLET ORAL
Qty: 3 | Refills: 0 | Status: ACTIVE | COMMUNITY
Start: 2025-08-25 | End: 1900-01-01

## 2025-08-29 ENCOUNTER — APPOINTMENT (OUTPATIENT)
Dept: CT IMAGING | Facility: CLINIC | Age: 89
End: 2025-08-29

## 2025-08-29 ENCOUNTER — OUTPATIENT (OUTPATIENT)
Dept: OUTPATIENT SERVICES | Facility: HOSPITAL | Age: 89
LOS: 1 days | End: 2025-08-29
Payer: MEDICARE

## 2025-08-29 DIAGNOSIS — K63.89 OTHER SPECIFIED DISEASES OF INTESTINE: ICD-10-CM

## 2025-08-29 PROCEDURE — 71250 CT THORAX DX C-: CPT

## 2025-08-29 PROCEDURE — 71250 CT THORAX DX C-: CPT | Mod: 26

## 2025-09-02 ENCOUNTER — OUTPATIENT (OUTPATIENT)
Dept: OUTPATIENT SERVICES | Facility: HOSPITAL | Age: 89
LOS: 1 days | End: 2025-09-02
Payer: MEDICARE

## 2025-09-02 VITALS
DIASTOLIC BLOOD PRESSURE: 82 MMHG | OXYGEN SATURATION: 97 % | HEIGHT: 63 IN | SYSTOLIC BLOOD PRESSURE: 135 MMHG | HEART RATE: 76 BPM | WEIGHT: 184.97 LBS | RESPIRATION RATE: 16 BRPM | TEMPERATURE: 98 F

## 2025-09-02 DIAGNOSIS — E11.9 TYPE 2 DIABETES MELLITUS WITHOUT COMPLICATIONS: ICD-10-CM

## 2025-09-02 DIAGNOSIS — K63.89 OTHER SPECIFIED DISEASES OF INTESTINE: ICD-10-CM

## 2025-09-02 DIAGNOSIS — Z01.818 ENCOUNTER FOR OTHER PREPROCEDURAL EXAMINATION: ICD-10-CM

## 2025-09-02 DIAGNOSIS — Z90.710 ACQUIRED ABSENCE OF BOTH CERVIX AND UTERUS: Chronic | ICD-10-CM

## 2025-09-02 DIAGNOSIS — K63.9 DISEASE OF INTESTINE, UNSPECIFIED: ICD-10-CM

## 2025-09-02 LAB
ANION GAP SERPL CALC-SCNC: 14 MMOL/L — SIGNIFICANT CHANGE UP (ref 5–17)
BLD GP AB SCN SERPL QL: NEGATIVE — SIGNIFICANT CHANGE UP
BUN SERPL-MCNC: 12 MG/DL — SIGNIFICANT CHANGE UP (ref 7–23)
CALCIUM SERPL-MCNC: 10 MG/DL — SIGNIFICANT CHANGE UP (ref 8.4–10.5)
CHLORIDE SERPL-SCNC: 103 MMOL/L — SIGNIFICANT CHANGE UP (ref 96–108)
CO2 SERPL-SCNC: 23 MMOL/L — SIGNIFICANT CHANGE UP (ref 22–31)
CREAT SERPL-MCNC: 0.66 MG/DL — SIGNIFICANT CHANGE UP (ref 0.5–1.3)
EGFR: 81 ML/MIN/1.73M2 — SIGNIFICANT CHANGE UP
EGFR: 81 ML/MIN/1.73M2 — SIGNIFICANT CHANGE UP
GLUCOSE SERPL-MCNC: 103 MG/DL — HIGH (ref 70–99)
HCT VFR BLD CALC: 32.3 % — LOW (ref 34.5–45)
HGB BLD-MCNC: 9.7 G/DL — LOW (ref 11.5–15.5)
MCHC RBC-ENTMCNC: 27.1 PG — SIGNIFICANT CHANGE UP (ref 27–34)
MCHC RBC-ENTMCNC: 30 G/DL — LOW (ref 32–36)
MCV RBC AUTO: 90.2 FL — SIGNIFICANT CHANGE UP (ref 80–100)
NRBC # BLD AUTO: 0 K/UL — SIGNIFICANT CHANGE UP (ref 0–0)
NRBC # FLD: 0 K/UL — SIGNIFICANT CHANGE UP (ref 0–0)
NRBC BLD AUTO-RTO: 0 /100 WBCS — SIGNIFICANT CHANGE UP (ref 0–0)
PLATELET # BLD AUTO: 352 K/UL — SIGNIFICANT CHANGE UP (ref 150–400)
PMV BLD: 11.3 FL — SIGNIFICANT CHANGE UP (ref 7–13)
POTASSIUM SERPL-MCNC: 4.4 MMOL/L — SIGNIFICANT CHANGE UP (ref 3.5–5.3)
POTASSIUM SERPL-SCNC: 4.4 MMOL/L — SIGNIFICANT CHANGE UP (ref 3.5–5.3)
RBC # BLD: 3.58 M/UL — LOW (ref 3.8–5.2)
RBC # FLD: 18.8 % — HIGH (ref 10.3–14.5)
RH IG SCN BLD-IMP: POSITIVE — SIGNIFICANT CHANGE UP
SODIUM SERPL-SCNC: 140 MMOL/L — SIGNIFICANT CHANGE UP (ref 135–145)
WBC # BLD: 7.83 K/UL — SIGNIFICANT CHANGE UP (ref 3.8–10.5)
WBC # FLD AUTO: 7.83 K/UL — SIGNIFICANT CHANGE UP (ref 3.8–10.5)

## 2025-09-02 PROCEDURE — 80048 BASIC METABOLIC PNL TOTAL CA: CPT

## 2025-09-02 PROCEDURE — 85027 COMPLETE CBC AUTOMATED: CPT

## 2025-09-02 PROCEDURE — G0463: CPT

## 2025-09-02 PROCEDURE — 86901 BLOOD TYPING SEROLOGIC RH(D): CPT

## 2025-09-02 PROCEDURE — 86900 BLOOD TYPING SEROLOGIC ABO: CPT

## 2025-09-02 PROCEDURE — 86850 RBC ANTIBODY SCREEN: CPT

## 2025-09-03 LAB
A1C WITH ESTIMATED AVERAGE GLUCOSE RESULT: 6.2 % — HIGH (ref 4–5.6)
ESTIMATED AVERAGE GLUCOSE: 131 MG/DL — HIGH (ref 68–114)

## 2025-09-19 ENCOUNTER — TRANSCRIPTION ENCOUNTER (OUTPATIENT)
Age: 89
End: 2025-09-19

## 2025-09-19 ENCOUNTER — APPOINTMENT (OUTPATIENT)
Dept: SURGERY | Facility: HOSPITAL | Age: 89
End: 2025-09-19
Payer: MEDICARE

## 2025-09-19 PROCEDURE — 44204 LAPARO PARTIAL COLECTOMY: CPT

## 2025-09-19 RX ORDER — B1/B2/B3/B5/B6/B12/VIT C/FOLIC 500-0.5 MG
1 TABLET ORAL
Refills: 0 | DISCHARGE

## 2025-09-19 RX ORDER — ATORVASTATIN CALCIUM 80 MG/1
1 TABLET, FILM COATED ORAL
Refills: 0 | DISCHARGE

## 2025-09-19 RX ORDER — MELATONIN 5 MG
1 TABLET ORAL
Refills: 0 | DISCHARGE

## 2025-09-19 RX ORDER — UBIDECARENONE 100 MG
200 CAPSULE ORAL
Refills: 0 | DISCHARGE

## 2025-09-23 PROBLEM — Z09 POSTOPERATIVE EXAMINATION: Status: ACTIVE | Noted: 2025-09-23
